# Patient Record
Sex: MALE | Race: BLACK OR AFRICAN AMERICAN | NOT HISPANIC OR LATINO | Employment: OTHER | URBAN - METROPOLITAN AREA
[De-identification: names, ages, dates, MRNs, and addresses within clinical notes are randomized per-mention and may not be internally consistent; named-entity substitution may affect disease eponyms.]

---

## 2020-07-15 PROBLEM — Z72.0 TOBACCO ABUSE: Status: ACTIVE | Noted: 2020-07-15

## 2020-07-15 PROBLEM — C34.92 PRIMARY LUNG ADENOCARCINOMA, LEFT: Status: ACTIVE | Noted: 2020-07-15

## 2020-07-15 PROBLEM — E11.9 TYPE 2 DIABETES MELLITUS WITHOUT COMPLICATION, WITHOUT LONG-TERM CURRENT USE OF INSULIN: Status: ACTIVE | Noted: 2020-07-15

## 2020-07-16 ENCOUNTER — OFFICE VISIT (OUTPATIENT)
Dept: HEMATOLOGY/ONCOLOGY | Facility: CLINIC | Age: 70
End: 2020-07-16
Payer: COMMERCIAL

## 2020-07-16 ENCOUNTER — LAB VISIT (OUTPATIENT)
Dept: LAB | Facility: HOSPITAL | Age: 70
End: 2020-07-16
Attending: INTERNAL MEDICINE
Payer: COMMERCIAL

## 2020-07-16 VITALS
HEIGHT: 71 IN | TEMPERATURE: 98 F | BODY MASS INDEX: 16.02 KG/M2 | OXYGEN SATURATION: 95 % | SYSTOLIC BLOOD PRESSURE: 198 MMHG | HEART RATE: 91 BPM | WEIGHT: 114.44 LBS | DIASTOLIC BLOOD PRESSURE: 123 MMHG | RESPIRATION RATE: 18 BRPM

## 2020-07-16 DIAGNOSIS — Z72.0 TOBACCO ABUSE: ICD-10-CM

## 2020-07-16 DIAGNOSIS — E11.9 TYPE 2 DIABETES MELLITUS WITHOUT COMPLICATION, WITHOUT LONG-TERM CURRENT USE OF INSULIN: ICD-10-CM

## 2020-07-16 DIAGNOSIS — C34.92 PRIMARY LUNG ADENOCARCINOMA, LEFT: ICD-10-CM

## 2020-07-16 DIAGNOSIS — J44.9 CHRONIC OBSTRUCTIVE PULMONARY DISEASE, UNSPECIFIED COPD TYPE: ICD-10-CM

## 2020-07-16 DIAGNOSIS — C34.92 PRIMARY LUNG ADENOCARCINOMA, LEFT: Primary | ICD-10-CM

## 2020-07-16 LAB
ALBUMIN SERPL BCP-MCNC: 3.5 G/DL (ref 3.5–5.2)
ALP SERPL-CCNC: 95 U/L (ref 55–135)
ALT SERPL W/O P-5'-P-CCNC: 16 U/L (ref 10–44)
ANION GAP SERPL CALC-SCNC: 11 MMOL/L (ref 8–16)
AST SERPL-CCNC: 35 U/L (ref 10–40)
BASOPHILS # BLD AUTO: 0.02 K/UL (ref 0–0.2)
BASOPHILS NFR BLD: 0.4 % (ref 0–1.9)
BILIRUB SERPL-MCNC: 0.3 MG/DL (ref 0.1–1)
BUN SERPL-MCNC: 18 MG/DL (ref 8–23)
CALCIUM SERPL-MCNC: 9.5 MG/DL (ref 8.7–10.5)
CHLORIDE SERPL-SCNC: 108 MMOL/L (ref 95–110)
CO2 SERPL-SCNC: 24 MMOL/L (ref 23–29)
CREAT SERPL-MCNC: 1.6 MG/DL (ref 0.5–1.4)
DIFFERENTIAL METHOD: ABNORMAL
EOSINOPHIL # BLD AUTO: 0.2 K/UL (ref 0–0.5)
EOSINOPHIL NFR BLD: 4.4 % (ref 0–8)
ERYTHROCYTE [DISTWIDTH] IN BLOOD BY AUTOMATED COUNT: 17.5 % (ref 11.5–14.5)
EST. GFR  (AFRICAN AMERICAN): 49 ML/MIN/1.73 M^2
EST. GFR  (NON AFRICAN AMERICAN): 43 ML/MIN/1.73 M^2
GLUCOSE SERPL-MCNC: 106 MG/DL (ref 70–110)
HCT VFR BLD AUTO: 42.3 % (ref 40–54)
HGB BLD-MCNC: 13.1 G/DL (ref 14–18)
IMM GRANULOCYTES # BLD AUTO: 0.01 K/UL (ref 0–0.04)
IMM GRANULOCYTES NFR BLD AUTO: 0.2 % (ref 0–0.5)
LYMPHOCYTES # BLD AUTO: 1.2 K/UL (ref 1–4.8)
LYMPHOCYTES NFR BLD: 25.8 % (ref 18–48)
MCH RBC QN AUTO: 27.9 PG (ref 27–31)
MCHC RBC AUTO-ENTMCNC: 31 G/DL (ref 32–36)
MCV RBC AUTO: 90 FL (ref 82–98)
MONOCYTES # BLD AUTO: 0.4 K/UL (ref 0.3–1)
MONOCYTES NFR BLD: 8.1 % (ref 4–15)
NEUTROPHILS # BLD AUTO: 2.9 K/UL (ref 1.8–7.7)
NEUTROPHILS NFR BLD: 61.1 % (ref 38–73)
NRBC BLD-RTO: 0 /100 WBC
PLATELET # BLD AUTO: 201 K/UL (ref 150–350)
PMV BLD AUTO: 8.9 FL (ref 9.2–12.9)
POTASSIUM SERPL-SCNC: 4.4 MMOL/L (ref 3.5–5.1)
PROT SERPL-MCNC: 8.1 G/DL (ref 6–8.4)
RBC # BLD AUTO: 4.7 M/UL (ref 4.6–6.2)
SODIUM SERPL-SCNC: 143 MMOL/L (ref 136–145)
WBC # BLD AUTO: 4.72 K/UL (ref 3.9–12.7)

## 2020-07-16 PROCEDURE — 36415 COLL VENOUS BLD VENIPUNCTURE: CPT

## 2020-07-16 PROCEDURE — 99999 PR PBB SHADOW E&M-NEW PATIENT-LVL V: ICD-10-PCS | Mod: PBBFAC,,, | Performed by: INTERNAL MEDICINE

## 2020-07-16 PROCEDURE — 99205 PR OFFICE/OUTPT VISIT, NEW, LEVL V, 60-74 MIN: ICD-10-PCS | Mod: S$PBB,,, | Performed by: INTERNAL MEDICINE

## 2020-07-16 PROCEDURE — 99205 OFFICE O/P NEW HI 60 MIN: CPT | Mod: S$PBB,,, | Performed by: INTERNAL MEDICINE

## 2020-07-16 PROCEDURE — 80053 COMPREHEN METABOLIC PANEL: CPT

## 2020-07-16 PROCEDURE — 85025 COMPLETE CBC W/AUTO DIFF WBC: CPT

## 2020-07-16 PROCEDURE — 99205 OFFICE O/P NEW HI 60 MIN: CPT | Mod: PBBFAC | Performed by: INTERNAL MEDICINE

## 2020-07-16 PROCEDURE — 99999 PR PBB SHADOW E&M-NEW PATIENT-LVL V: CPT | Mod: PBBFAC,,, | Performed by: INTERNAL MEDICINE

## 2020-07-16 RX ORDER — METFORMIN HYDROCHLORIDE 850 MG/1
850 TABLET ORAL
COMMUNITY

## 2020-07-16 RX ORDER — ASPIRIN 81 MG/1
81 TABLET ORAL
COMMUNITY

## 2020-07-16 RX ORDER — BUDESONIDE 0.5 MG/2ML
2 INHALANT ORAL
COMMUNITY

## 2020-07-16 RX ORDER — BISACODYL 5 MG
TABLET, DELAYED RELEASE (ENTERIC COATED) ORAL
COMMUNITY

## 2020-07-16 RX ORDER — ENALAPRIL MALEATE 20 MG/1
40 TABLET ORAL
COMMUNITY

## 2020-07-16 RX ORDER — ALENDRONATE SODIUM 10 MG/1
TABLET ORAL
COMMUNITY

## 2020-07-16 RX ORDER — ATORVASTATIN CALCIUM 80 MG/1
80 TABLET, FILM COATED ORAL
COMMUNITY

## 2020-07-16 RX ORDER — HYDROCHLOROTHIAZIDE 12.5 MG/1
12.5 CAPSULE ORAL
COMMUNITY

## 2020-07-16 NOTE — PATIENT INSTRUCTIONS
Labs today  MRI brain and RV after that   COMFORT for:  - imaging CD uploaded of PET-CT  - pathology  Report from bronchoscopy/EBUS from [facility]  - colonoscopy report and pathology report from [facility] in [place]

## 2020-07-16 NOTE — PROGRESS NOTES
"  Subjective:      DATE OF VISIT: 7/16/20     ?  Patient ID:?Mikey Saldaña is a 69 y.o. male.?? MR#: [id number]   ?   PRIMARY ONCOLOGIST: Dr. Marie    REFERRING PROVIDER: [facility]  [address] [place] [address]     ? Primary Care Providers:  [facility] - * (General)     CHIEF COMPLAINT:  Newly diagnosed lung cancer[facility]   ?   ONCOLOGIC DIAGNOSIS:  Lung adenocarcinoma  ?   CURRENT TREATMENT: TBD    PAST TREATMENT: none  ?   ONCOLOGIC HISTORY:   ?   Mr. Saldaña is a 69-year-old man [employer] , current tobacco user of 40 pack years, type 2 diabetes, hypertension, hepatitis-B, elevated PSA, carotid artery stenosis, peripheral vascular disease, osteoporosis, depression, PTSD.    [facility] due to growth of suspicious nodule on CT, followed up by PET-CT.    03/13/2020 PET-CT showed "moderate FDG uptake at right lower lobe noncalcified pulmonary nodule measuring 1.2 x 0.9 cm, which is suspicious for malignancy with a differential also including sequelae from an infectious/inflammatory etiology.  No definite moderate or intensely avid FDG avid suspicious lymph node.  2. focal intense FDG uptake at right transverse colon which localizes with bowel wall soft tissue density measuring 2.0 x 2.8 cm which is suspicious for colonic neoplasia which could be a malignant or benign.  3.  Aftormentioned focal intense FDG uptake at right transverse colon immediately abuts the margin of the right liver lobe anteriorly for which involvement of the liver cannot be completely excluded."    In June 2020 at [facility] he underwent EB[facility] with biopsy. [facility]: "sampling right lower lobe with lepidic adenocarcinoma.  EBUS TBNA of 11 L with rare atypical cells.  4L, 4R and 11R negative for malignancy.  11R suspicious for malignancy.  10 mm."    06/19/2020 [facility] noted: "reviewed directly via [facility] records " "consistent with pT1b N1 with suspicious for malignancy from 11 RS"    Patient colonoscopy which reportedly showed polyps, do not procedure or pathology reports available at this time.    INTERVAL EVENTS    During his visit me today he is accompanied by his daughter lives in [place] but is staying for the time being.  He denies any symptoms aside from stable dyspnea on exertion.  Anorexia or weight loss, cough, hemoptysis hematemesis, chest or other pain.  He continues to smoke 1 pack per day on average for about 40 years.        Review of Systems    ?   A comprehensive 14-point review of systems was reviewed with patient and was negative other than as specified above.   ?   PAST MEDICAL HISTORY:   History reviewed. No pertinent past medical history. ?     PAST SURGICAL HISTORY:   Past Surgical History:   Procedure Laterality Date    NECK SURGERY      PANCREAS SURGERY      SHOULDER SURGERY        ?   ALLERGIES:   Allergies as of 07/16/2020 - Reviewed 07/16/2020   Allergen Reaction Noted    Shrimp Hives, Itching, and Rash 08/16/2018      ?   MEDICATIONS:?   No outpatient medications have been marked as taking for the 7/16/20 encounter (Office Visit) with Alyssa Marie MD.      ?   SOCIAL HISTORY:?   Social History     Tobacco Use    Smoking status: Current Every Day Smoker    Smokeless tobacco: Never Used   Substance Use Topics    Alcohol use: Never     Frequency: Never      ?   1 PACK PER DAY TIMES 40 YEARS, CURRENT SMOKER   ?   FAMILY HISTORY:   family history is not on file.   ?       Objective:      Physical Exam      ?   Vitals:    07/16/20 0818   BP: (!) 198/123   Pulse: 91   Resp: 18   Temp: 97.6 °F (36.4 °C)      ?   ECOG:?1   General appearance: Generally well appearing, in no acute distress, for all.   Head, eyes, ears, nose, and throat: Pupils round and equally reactive to light. Oropharynx clear with moist mucous membranes.   Lymph: No palpable cervical or supraclavicular lymphadenopathy. " "  Cardiovascular: Regular rate and rhythm, S1, S2, no audible murmurs.   Respiratory: Lungs clear to auscultation bilaterally.   Abdomen:  Scaphoid, nondistended, nontender, soft  Extremities: Warm, without edema.   Neurologic: Alert and oriented. Grossly normal strength, coordination, and gait.   Skin: No rashes, ecchymoses or petechial lesion.   ?     Laboratory:  ?   No results found for any previous visit.      ?     ?   ?   Imaging:  ?  I have reviewed the patient's medical history in detail and updated the computerized patient record.        Pathology:    None available     ?   Assessment/Plan:       1. Primary lung adenocarcinoma, left    2. Tobacco abuse    3. Type 2 diabetes mellitus without complication, without long-term current use of insulin    4. Chronic obstructive pulmonary disease, unspecified COPD type      thank     Plan:     # primary lung adenocarcinoma: [facility] provided: 1.2 cm FDG avid lesion in right lower lobe without reported avid lymph node.  Right lower lobe lesion  biopsy positive for lepidic adenocarcinoma" EBUS noted 11 L with rare atypical cells, for L, 4R and 11R negative for malignancy, 11 are suspicious 10 mm.  I discussed findings per my review of outside records and need to obtain additional information to confirm stage.  Will need MRI brain for complete staging and likely radiation oncology consultation as well.  - request CD to personally review PET-CT, EBUS report, pathology from EBUS    # colonic lesion/possible liver abnormality:  Per outside records  "focal intense FDG uptake at right transverse colon which localizes with bowel wall soft tissue density measuring 2.0 x 2.8 cm which is suspicious for colonic neoplasia which could be a malignant or benign.  3.  Aftormentioned focal intense FDG uptake at right transverse colon immediately abuts the margin of the right liver lobe anteriorly for which involvement of the liver cannot be completely excluded."  Discussed " importance of excluding 2nd neoplasm or metastatic disease in colon and or liver.  May need additional liver imaging.  Requesting PET-CT for further review.  Patient reports colonoscopy with benign polyps.  Will request this pathology report.    # current tobacco user:  40 pack-year smoker, COPD, tenuous functional status and will need to take into account with treatment planning.      Follow-Up:   Labs today  MRI brain and RV after that   COMFORT for:  - imaging CD uploaded of PET-CT  - pathology  Report from bronchoscopy/EBUS from [facility]  - colonoscopy report and pathology report from [facility]

## 2020-07-16 NOTE — LETTER
July 16, 2020      [facility]  [address] [place] [address] [address]            [facility] - Hematology Oncology  [id number] [place] [id number]  Phone: [phone]  Fax: [phone]          Patient: Mikey Saldaña   MR Number: [id number]   YOB: 1950   Date of Visit: 7/16/2020       Dear [facility]:    Thank you for referring Mikey Saldaña to me for evaluation. Attached you will find relevant portions of my assessment and plan of care.    If you have questions, please do not hesitate to call me. I look forward to following Mikey Saldaña along with you.    Sincerely,    Alyssa Marie MD    Enclosure  CC:  No Recipients    If you would like to receive this communication electronically, please contact [email or web address] or [phone] to request more information on [facility] access.    For providers and/or their staff who would like to refer a patient to [facility], please contact us through our one-stop-shop provider referral line, Rajeev Roberto, at [phone].    If you feel you have received this communication in error or would no longer like to receive these types of communications, please e-mail [email or web address]

## 2020-07-22 ENCOUNTER — DOCUMENTATION ONLY (OUTPATIENT)
Dept: HEMATOLOGY/ONCOLOGY | Facility: CLINIC | Age: 70
End: 2020-07-22

## 2020-07-22 ENCOUNTER — TELEPHONE (OUTPATIENT)
Dept: HEMATOLOGY/ONCOLOGY | Facility: CLINIC | Age: 70
End: 2020-07-22

## 2020-07-22 NOTE — PROGRESS NOTES
7/22/2020 @ 220pm-Received pathology report via email from Sandra Ventura,   Scanned into media tab of epic chart.

## 2020-07-23 ENCOUNTER — HOSPITAL ENCOUNTER (OUTPATIENT)
Dept: RADIOLOGY | Facility: HOSPITAL | Age: 70
Discharge: HOME | End: 2020-07-23
Attending: INTERNAL MEDICINE
Payer: OTHER GOVERNMENT

## 2020-07-23 ENCOUNTER — INITIAL CONSULT (OUTPATIENT)
Dept: RADIATION ONCOLOGY | Facility: CLINIC | Age: 70
End: 2020-07-23
Payer: COMMERCIAL

## 2020-07-23 ENCOUNTER — OFFICE VISIT (OUTPATIENT)
Dept: HEMATOLOGY/ONCOLOGY | Facility: CLINIC | Age: 70
End: 2020-07-23
Payer: COMMERCIAL

## 2020-07-23 VITALS
TEMPERATURE: 98 F | SYSTOLIC BLOOD PRESSURE: 172 MMHG | BODY MASS INDEX: 16.32 KG/M2 | OXYGEN SATURATION: 94 % | DIASTOLIC BLOOD PRESSURE: 90 MMHG | HEIGHT: 70 IN | WEIGHT: 114 LBS | RESPIRATION RATE: 17 BRPM | HEART RATE: 89 BPM

## 2020-07-23 VITALS
SYSTOLIC BLOOD PRESSURE: 160 MMHG | TEMPERATURE: 98 F | WEIGHT: 114 LBS | BODY MASS INDEX: 16.32 KG/M2 | HEIGHT: 70 IN | DIASTOLIC BLOOD PRESSURE: 93 MMHG | HEART RATE: 94 BPM | RESPIRATION RATE: 14 BRPM

## 2020-07-23 DIAGNOSIS — K63.9 SMALL BOWEL LESION: ICD-10-CM

## 2020-07-23 DIAGNOSIS — C34.92 PRIMARY LUNG ADENOCARCINOMA, LEFT: ICD-10-CM

## 2020-07-23 DIAGNOSIS — C34.91 PRIMARY LUNG ADENOCARCINOMA, RIGHT: Primary | ICD-10-CM

## 2020-07-23 DIAGNOSIS — C34.92 PRIMARY LUNG ADENOCARCINOMA, LEFT: Primary | ICD-10-CM

## 2020-07-23 PROCEDURE — 99214 OFFICE O/P EST MOD 30 MIN: CPT | Mod: PBBFAC,25 | Performed by: RADIOLOGY

## 2020-07-23 PROCEDURE — 99999 PR PBB SHADOW E&M-EST. PATIENT-LVL IV: ICD-10-PCS | Mod: PBBFAC,,, | Performed by: RADIOLOGY

## 2020-07-23 PROCEDURE — 99215 OFFICE O/P EST HI 40 MIN: CPT | Mod: S$PBB,,, | Performed by: INTERNAL MEDICINE

## 2020-07-23 PROCEDURE — A9585 GADOBUTROL INJECTION: HCPCS | Performed by: INTERNAL MEDICINE

## 2020-07-23 PROCEDURE — 99205 PR OFFICE/OUTPT VISIT, NEW, LEVL V, 60-74 MIN: ICD-10-PCS | Mod: S$PBB,,, | Performed by: RADIOLOGY

## 2020-07-23 PROCEDURE — 99999 PR PBB SHADOW E&M-EST. PATIENT-LVL V: ICD-10-PCS | Mod: PBBFAC,,, | Performed by: INTERNAL MEDICINE

## 2020-07-23 PROCEDURE — 70553 MRI BRAIN STEM W/O & W/DYE: CPT | Mod: TC

## 2020-07-23 PROCEDURE — 99215 PR OFFICE/OUTPT VISIT, EST, LEVL V, 40-54 MIN: ICD-10-PCS | Mod: S$PBB,,, | Performed by: INTERNAL MEDICINE

## 2020-07-23 PROCEDURE — 99215 OFFICE O/P EST HI 40 MIN: CPT | Mod: PBBFAC,25,27 | Performed by: INTERNAL MEDICINE

## 2020-07-23 PROCEDURE — 99999 PR PBB SHADOW E&M-EST. PATIENT-LVL IV: CPT | Mod: PBBFAC,,, | Performed by: RADIOLOGY

## 2020-07-23 PROCEDURE — 99205 OFFICE O/P NEW HI 60 MIN: CPT | Mod: S$PBB,,, | Performed by: RADIOLOGY

## 2020-07-23 PROCEDURE — 99999 PR PBB SHADOW E&M-EST. PATIENT-LVL V: CPT | Mod: PBBFAC,,, | Performed by: INTERNAL MEDICINE

## 2020-07-23 PROCEDURE — 25500020 PHARM REV CODE 255: Performed by: INTERNAL MEDICINE

## 2020-07-23 RX ORDER — GADOBUTROL 604.72 MG/ML
10 INJECTION INTRAVENOUS
Status: COMPLETED | OUTPATIENT
Start: 2020-07-23 | End: 2020-07-23

## 2020-07-23 RX ADMIN — GADOBUTROL 5 ML: 604.72 INJECTION INTRAVENOUS at 06:07

## 2020-07-23 NOTE — Clinical Note
Hey, I forgot to mention a few things to you 1. I am requesting actual path report from EBUS (cannot find here) to determine adequacy of sample. 2. I am concerned about small bowel lesion and transverse colon/liver? Avidity. We will need to fup on repeat PET and possible biopsy since not evaluated on scope. Im getting a cea too.

## 2020-07-23 NOTE — PROGRESS NOTES
"  Subjective:      DATE OF VISIT: 7/23/20     ?  Patient ID:?Mikey Saldaña is a 69 y.o. male.?? MR#: [id number]   ?   PRIMARY ONCOLOGIST: Dr. Marie    ? Primary Care Providers:  [facility] - * (General)     CHIEF COMPLAINT:  Follow-up  ?   ONCOLOGIC DIAGNOSIS:  Lung adenocarcinoma  ?   CURRENT TREATMENT: TBD    PAST TREATMENT: none  ?   ONCOLOGIC HISTORY:   ?   Mr. Saldaña is a 69-year-old man [employer] , current tobacco user of 40 pack years, type 2 diabetes, hypertension, hepatitis-B, elevated PSA, carotid artery stenosis, peripheral vascular disease, osteoporosis, depression, PTSD.    [facility] due to growth of suspicious nodule on CT, followed up by PET-CT.    03/13/2020 PET-CT showed "moderate FDG uptake at right lower lobe noncalcified pulmonary nodule measuring 1.2 x 0.9 cm, which is suspicious for malignancy with a differential also including sequelae from an infectious/inflammatory etiology.  No definite moderate or intensely avid FDG avid suspicious lymph node.  2. focal intense FDG uptake at right transverse colon which localizes with bowel wall soft tissue density measuring 2.0 x 2.8 cm which is suspicious for colonic neoplasia which could be a malignant or benign.  3.  Aftormentioned focal intense FDG uptake at right transverse colon immediately abuts the margin of the right liver lobe anteriorly for which involvement of the liver cannot be completely excluded."  In body of radiology report notes  RLL pulmonary nodule SUV 3.7, R hilar  2.6, L hilar 2.3, precarinal lymph node 1.3x1.5.     RLL lesion outside pathology report notes:  " lepidic non mucinous adenocarcinoma consistent with lung primary. Positive for napsin A, TTF1 CK5/6 and CK7, negative for CK 20,  p40 and p63."    June 2020 EBUS at [facility]. Per a clinic note (do not have pathology report) EBUS TBNA of 11 L with rare atypical cells.  4L, 4R and 11R negative for malignancy.  "     5/26/20 colonoscopy at [facility] report noted 2 sessile transverse colon polyps.  Pathology from outside noting tubular adenoma.    [facility] for evidence of metastatic disease    INTERVAL EVENTS    He is accompanied by his daughter to review results of MRI brain and follow-up after I have received and reviewed outside records including:  Uploaded imaging from outside including PET March 2020, pathology report from right lower lobe lesion, colonoscopy report and pathology report, EBUS report (no pathology report provided from my review).  He is in stable condition and denies any productive cough/hemoptysis, chest pain, shortness of breath or change from baseline dyspnea on exertion.  He continues to smoke.      Review of Systems    ?   A comprehensive 14-point review of systems was reviewed with patient and was negative other than as specified above.   ?   Objective:      Physical Exam      ?   Vitals:    07/23/20 0900   BP: (!) 160/93   Pulse: 94   Resp: 14   Temp: 97.7 °F (36.5 °C)      ?   ECOG:?1   General appearance: Generally well appearing, in no acute distress, for all.   Head, eyes, ears, nose, and throat: Pupils round and equally reactive to light. Oropharynx clear with moist mucous membranes.   Lymph: No palpable cervical or supraclavicular lymphadenopathy.   Cardiovascular: Regular rate and rhythm, S1, S2, no audible murmurs.   Respiratory: Lungs clear to auscultation bilaterally.   Abdomen:  Scaphoid, nondistended, nontender, soft  Extremities: Warm, without edema.   Neurologic: Alert and oriented. Grossly normal strength, coordination, and gait.   Skin: No rashes, ecchymoses or petechial lesion.   ?     Laboratory:  ?   No visits with results within 1 Day(s) from this visit.   Latest known visit with results is:   Lab Visit on 07/16/2020   Component Date Value Ref Range Status    WBC 07/16/2020 4.72  3.90 - 12.70 K/uL Final    RBC 07/16/2020 4.70  4.60 - 6.20 M/uL Final     Hemoglobin 07/16/2020 13.1* 14.0 - 18.0 g/dL Final    Hematocrit 07/16/2020 42.3  40.0 - 54.0 % Final    Mean Corpuscular Volume 07/16/2020 90  82 - 98 fL Final    Mean Corpuscular Hemoglobin 07/16/2020 27.9  27.0 - 31.0 pg Final    Mean Corpuscular Hemoglobin Conc 07/16/2020 31.0* 32.0 - 36.0 g/dL Final    RDW 07/16/2020 17.5* 11.5 - 14.5 % Final    Platelets 07/16/2020 201  150 - 350 K/uL Final    MPV 07/16/2020 8.9* 9.2 - 12.9 fL Final    Immature Granulocytes 07/16/2020 0.2  0.0 - 0.5 % Final    Gran # (ANC) 07/16/2020 2.9  1.8 - 7.7 K/uL Final    Immature Grans (Abs) 07/16/2020 0.01  0.00 - 0.04 K/uL Final    Lymph # 07/16/2020 1.2  1.0 - 4.8 K/uL Final    Mono # 07/16/2020 0.4  0.3 - 1.0 K/uL Final    Eos # 07/16/2020 0.2  0.0 - 0.5 K/uL Final    Baso # 07/16/2020 0.02  0.00 - 0.20 K/uL Final    nRBC 07/16/2020 0  0 /100 WBC Final    Gran% 07/16/2020 61.1  38.0 - 73.0 % Final    Lymph% 07/16/2020 25.8  18.0 - 48.0 % Final    Mono% 07/16/2020 8.1  4.0 - 15.0 % Final    Eosinophil% 07/16/2020 4.4  0.0 - 8.0 % Final    Basophil% 07/16/2020 0.4  0.0 - 1.9 % Final    Differential Method 07/16/2020 Automated   Final    Sodium 07/16/2020 143  136 - 145 mmol/L Final    Potassium 07/16/2020 4.4  3.5 - 5.1 mmol/L Final    Chloride 07/16/2020 108  95 - 110 mmol/L Final    CO2 07/16/2020 24  23 - 29 mmol/L Final    Glucose 07/16/2020 106  70 - 110 mg/dL Final    BUN, Bld 07/16/2020 18  8 - 23 mg/dL Final    Creatinine 07/16/2020 1.6* 0.5 - 1.4 mg/dL Final    Calcium 07/16/2020 9.5  8.7 - 10.5 mg/dL Final    Total Protein 07/16/2020 8.1  6.0 - 8.4 g/dL Final    Albumin 07/16/2020 3.5  3.5 - 5.2 g/dL Final    Total Bilirubin 07/16/2020 0.3  0.1 - 1.0 mg/dL Final    Alkaline Phosphatase 07/16/2020 95  55 - 135 U/L Final    AST 07/16/2020 35  10 - 40 U/L Final    ALT 07/16/2020 16  10 - 44 U/L Final    Anion Gap 07/16/2020 11  8 - 16 mmol/L Final    eGFR if African American 07/16/2020  "49* >60 mL/min/1.73 m^2 Final    eGFR if non African American 07/16/2020 43* >60 mL/min/1.73 m^2 Final      ?   ?   Imaging:  ?  I have reviewed the patient's medical history in detail and updated the computerized patient record.      MRI BRAIN W WO CONTRAST     CLINICAL HISTORY:  Malignant neoplasm of unspecified part of left bronchus or lungstaging;     TECHNIQUE:  Sagittal T1. Axial T1, T2, T2 FLAIR, GRE, DWI.  Axial and coronal T1 post contrast.     COMPARISON:  None available.     FINDINGS:  There is no restricted diffusion. Minimal patchy T2 FLAIR hyperintensity within the periventricular and deep white matter bilaterally.  No abnormal enhancement.     The ventricles and sulci are normal in size and configuration. Midline structures are normal. There are preserved arterial flow-voids on T2 weighted imaging. Bilateral mastoid tip effusions.  The paranasal sinuses are clear.     No abnormal marrow signal is identified.     Impression:     1. No evidence of metastatic disease.  2. White matter T2 alteration consistent with mild chronic microvascular ischemia.     Pathology:    I have reviewed the patient's medical history in detail and updated the computerized patient record.    ?   Assessment/Plan:       1. Primary lung adenocarcinoma, right    2. Primary lung adenocarcinoma, left    3. Small bowel lesion        Plan:     # right lower lobe adenocarcinoma, stage TBD:  I reviewed in detail available outside records which is notable for 1.2 cm right lower lobe lesion biopsy proven adenocarcinoma most consistent with lung primary per outside pathology report noting "lipidic non mucinous Positive for napsin A, TTF1 CK5/6 and CK7, negative for CK 20,  p40 and p63."  Unclear mediastinal lymph node involvement.  PET shows mild avidity in bilateral hilar (SUV 2.6 and 2.3 right and left respectively) and enlarged precarinal lymph node 1.3 x 1.5 cm EBUS and precarinal lymph nodes SUV, [facility] provided: 1.2 cm FDG " "avid lesion in right lower lobe without reported avid lymph node.  Right lower lobe lesion  biopsy positive for lepidic adenocarcinoma" EBUS procedure report without notable in large mint noted less than or equal to 1 cm; pathology per a clinic note showed 11L rare atypical cells, 4R, 4L and 11R without malignancy however it should be noted that I do not have actual pathology report and unclear if there was adequate sampling of these lymph nodes.   I have discussed his case with Dr. Conley in radiation oncology and we have decided to get repeat PET-CT to further evaluate extent of disease especially given interval of 4 months since his initial PET imaging on outside. I will also request pathology report from EBUS.    # colonic lesion/possible liver abnormality:  Per outside records  "focal intense FDG uptake at right transverse colon which localizes with bowel wall soft tissue density measuring 2.0 x 2.8 cm which is suspicious for colonic neoplasia which could be a malignant or benign.  3.  Aftormentioned focal intense FDG uptake at right transverse colon immediately abuts the margin of the right liver lobe anteriorly for which involvement of the liver cannot be completely excluded."  Discussed importance of excluding 2nd neoplasm or metastatic disease in colon and or liver.   - repeat PET-CT per above. May require dedicated liver imaging.  - coloscopy report from [facility] noted sessile polyps tubular adenoma.   It does not appear small bowel abnormality was biopsied in may need to consider if repeat PET continues to show avidity.  CEA ordered    # current tobacco user:  40 pack-year smoker, COPD, tenuous functional status and will need to take into account with treatment planning.      Follow-Up:   Repeat PET-CT ordered, RV after this  Lab CEA   Request for EBUS pathology report        "

## 2020-07-23 NOTE — PROGRESS NOTES
[facility] [place]  RADIATION ONCOLOGY CONSULTATION    Name: Mikey Saldaña  : 1950      Patient Referred To Radiation Oncology By:  Dr. 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 male who presents for consultation for the above diagnosis.   [facility] noted growth of lung nodule.  PET 3/13/20 showed moderate FDG uptake at RLL nodule 1.2 x 0.9cm, suspicious for malignancy, no definitive avid adenopathy, FDG focal intense uptake in R transverse colon suspicious for neoplasm.  2020 he had [facility] and [facility] notes sampling RLL with lepidic adenocarcinoma, EBUS TBNA 11L rare atypical cells, 4L, 4R, 11R negative for malignancy.   Pathology 20 showed consistent with pT1bN1 suspicious for malignancy in 11R.   Colonoscopy showed polyps, no path reports available.   He saw Dr. Marie and workup has already begun to obtain records and determine stage.  MRI brain today was negative for intracranial disease.  Today, he notes stable shortness of breath.  He has lost about 15lbs in the past few months. He is still smoking 1-2 packs per day and is not interested in quitting, but can cut down a bit.  Denies hemoptysis, worsening shortness of breath, chest pain, dysphagia/odynophagia.    REVIEW OF SYSTEMS: (Positive findings bold, otherwise negative)   Constitutional: fever, fatigue, weight change  Eyes: blurred vision in the past 3 months, double vision   ENT: ear pain, new mouth lesions, jaw pain, difficulty swallowing, sore throat  Cardiovascular: chest pain on exertion, reflux, leg swelling  Respiratory: shortness of breath, dyspnea, cough, hemoptysis.   GI: abdominal pain, diarrhea, constipation, blood in stool, painful bowel movements  : painful or burning  urination, blood in urine  Musculoskeletal: new bone or joint pains  Neurologic: headache, seizure, focal numbness or tingling, balance changes, speech changes  Lymph: new or enlarged lymph nodes  Psychiatric: depression, anxiety    PRIOR RADIATION HISTORY: none    PAST MEDICAL HISTORY:  No past medical history on file.    PAST SURGICAL HISTORY:  Past Surgical History:   Procedure Laterality Date    NECK SURGERY      PANCREAS SURGERY      SHOULDER SURGERY         ALLERGIES:   Review of patient's allergies indicates:   Allergen Reactions    Shrimp Hives, Itching and Rash       MEDICATIONS:    Current Outpatient Medications:     alendronate (FOSAMAX) 10 MG Tab, alendronate, Disp: , Rfl:     aspirin (ECOTRIN) 81 MG EC tablet, Take 81 mg by mouth., Disp: , Rfl:     atorvastatin (LIPITOR) 80 MG tablet, Take 80 mg by mouth., Disp: , Rfl:     bisacodyL (DULCOLAX, BISACODYL,) 5 mg EC tablet, Dulcolax (bisacodyl) 5 mg tablet,delayed release  Take 4 tablets by oral route as directed., Disp: , Rfl:     budesonide (PULMICORT) 0.5 mg/2 mL nebulizer solution, 2 mLs., Disp: , Rfl:     enalapril (VASOTEC) 20 MG tablet, Take 40 mg by mouth., Disp: , Rfl:     hydroCHLOROthiazide (MICROZIDE) 12.5 mg capsule, Take 12.5 mg by mouth., Disp: , Rfl:     metFORMIN (GLUCOPHAGE) 850 MG tablet, Take 850 mg by mouth., Disp: , Rfl:     sildenafil citrate (VIAGRA ORAL), sildenafil, Disp: , Rfl:     varenicline tartrate (CHANTIX ORAL), varenicline, Disp: , Rfl:   No current facility-administered medications for this visit.     SOCIAL HISTORY:  Social History     Socioeconomic History    Marital status:      Spouse name: Not on file    Number of children: Not on file    Years of education: Not on file    Highest education level: Not on file   Occupational History    Not on file   Social Needs    Financial resource strain: Not on file    Food insecurity     Worry: Not on file     Inability: Not on file    Transportation  "needs     Medical: Not on file     Non-medical: Not on file   Tobacco Use    Smoking status: Current Every Day Smoker    Smokeless tobacco: Never Used   Substance and Sexual Activity    Alcohol use: Never     Frequency: Never    Drug use: Not on file    Sexual activity: Not on file   Lifestyle    Physical activity     Days per week: Not on file     Minutes per session: Not on file    Stress: Not on file   Relationships    Social connections     Talks on phone: Not on file     Gets together: Not on file     Attends Mosque service: Not on file     Active member of club or organization: Not on file     Attends meetings of clubs or organizations: Not on file     Relationship status: Not on file   Other Topics Concern    Not on file   Social History Narrative    Not on file     Lives in [place]    FAMILY HISTORY:  No family history on file.    PHYSICAL EXAMINATION:  Constitutional: thin, well appearing, no acute distress, ECOG 1 - Ambulates, capable of light work  Vitals:    BP (!) 172/90   Pulse 89   Temp 97.7 °F (36.5 °C)   Resp 17   Ht 5' 10" (1.778 m)   Wt 51.7 kg (113 lb 15.7 oz)   SpO2 (!) 94%   BMI 16.35 kg/m²   Eyes: sclera anicteric, EOMI, pupils equal, round and reactive to light  ENT: oral cavity without lesions, moist mucous membranes  Neck: trachea midline, neck supple  Lymphatic: no cervical, supraclavicular or axillary adenopathy  Cardiovascular: regular rate, no murmurs, no edema of the upper or lower extremities, radial pulse 2+  Respiratory: unlabored effort, clear to auscultation, no wheezes  Abdomen: soft, non-tender, no rigidity, no masses, no hepatomegaly  Neuro: Cranial nerves III-XII intact, speech not slurred, gait non-ataxic, no dysdiadochokinesia, strength 5/5 upper and lower extremities, reflexes patellar  Spine: non-tender to percussion cervical, thoracic and lumbosacral spine    IMAGING AND LABORATORY FINDINGS: As per HPI; images reviewed personally.    ASSESSMENT: 69 " y.o. male with likely stage II NSCLC pending complete workup and new PET    PLAN: Mr. Saldaña has what appears to be a NSCLC with hilar node involvement based on prior workup. We will need to obtain records to decide if he would be served best by chemoradiation for stage IIB NSCLC vs SBRT alone for stage I NSCLC. He would likely be best served by repeat PET as last PET was in March; Dr. Marei has ordered.    Radiation options are 60Gy/30fx with chemotherapy vs SBRT alone. Some potential toxicities include fatigue, esophagitis, and risk of pneumonitis. I lean towards 60Gy/30fx given his likelihood of lymph node involvement is high after reviewing records, but will decide based on new PET next week.    We discussed the techniques, toxicities and indications of radiation and I answered the patient's questions to their apparent satisfaction.CT simulation will be scheduled after PET next week to expedite treatment.    I spent approximately 60 minutes reviewing the available records and evaluating the patient, out of which over 50% of the time was spent face to face with the patient in counseling and coordinating this patient's care.    Paul Conley III, M.D.  Radiation Oncology  [facility] [address] [place] [address]  Ph: [phone]  [email or web address]

## 2020-07-23 NOTE — LETTER
July 23, 2020      Alyssa Marie MD  [address]  [place] [address]            Cancer Center - Radiation Oncology  [address] [facility] [address]  Phone: [phone]  Fax: [phone]          Patient: Mikey Saldaña   MR Number: [id number]   YOB: 1950   Date of Visit: 7/23/2020       Dear Dr. Alyssa Marie:    Thank you for referring Mikey Saldaña to me for evaluation. Attached you will find relevant portions of my assessment and plan of care.    If you have questions, please do not hesitate to call me. I look forward to following Mikey Saldaña along with you.    Sincerely,    Paul Conley III, MD    Enclosure  CC:  No Recipients    If you would like to receive this communication electronically, please contact [email or web address] or [phone] to request more information on As Seen on TV Link access.    For providers and/or their staff who would like to refer a patient to [facility], please contact us through our one-stop-shop provider referral line, [facility], at [phone].    If you feel you have received this communication in error or would no longer like to receive these types of communications, please e-mail [email or web address]

## 2020-07-27 ENCOUNTER — DOCUMENTATION ONLY (OUTPATIENT)
Dept: HEMATOLOGY/ONCOLOGY | Facility: CLINIC | Age: 70
End: 2020-07-27

## 2020-07-27 ENCOUNTER — TELEPHONE (OUTPATIENT)
Dept: RADIOLOGY | Facility: HOSPITAL | Age: 70
End: 2020-07-27

## 2020-07-27 NOTE — NURSING
Spoke with Andrew in cytology at [facility] [phone] to request EBUS pathology results.  He will be faxing me what he has on that specimen today. Dr lucero notified.

## 2020-07-27 NOTE — NURSING
Spoke with patient regarding follow up with  after PET scan.  Together we set up follow up for 7/30/20 at the [place] location.  Pt is agreeable to date time and location.    Oncology Navigation   Intake  Date Worked: 07/27/20     Treatment                  Acuity      Follow Up  No follow-ups on file.

## 2020-07-27 NOTE — NURSING
Spoke with pathology at [facility] this am .  They stated they do not have any pathology from the EBUS from 6/19/2020.  They have sent us what they have from bronchoscopy path from same date 6/19/20. Dr Marie notified   Oncology Navigation   Intake  Date Worked: 07/27/20     Treatment                  Acuity      Follow Up  No follow-ups on file.

## 2020-07-28 ENCOUNTER — DOCUMENTATION ONLY (OUTPATIENT)
Dept: HEMATOLOGY/ONCOLOGY | Facility: CLINIC | Age: 70
End: 2020-07-28

## 2020-07-28 NOTE — NURSING
Received EBUS cytology report from [facility] and scanned into media tab of epic record.  Dr Marie notified.   Oncology Navigation   Intake  Date Worked: 07/28/20     Treatment                  Acuity      Follow Up  No follow-ups on file.

## 2020-07-29 ENCOUNTER — LAB VISIT (OUTPATIENT)
Dept: LAB | Facility: HOSPITAL | Age: 70
End: 2020-07-29
Attending: INTERNAL MEDICINE
Payer: OTHER GOVERNMENT

## 2020-07-29 ENCOUNTER — TELEPHONE (OUTPATIENT)
Dept: HEMATOLOGY/ONCOLOGY | Facility: CLINIC | Age: 70
End: 2020-07-29

## 2020-07-29 DIAGNOSIS — C34.92 PRIMARY LUNG ADENOCARCINOMA, LEFT: ICD-10-CM

## 2020-07-29 PROCEDURE — 82378 CARCINOEMBRYONIC ANTIGEN: CPT

## 2020-07-29 PROCEDURE — 36415 COLL VENOUS BLD VENIPUNCTURE: CPT

## 2020-07-29 NOTE — TELEPHONE ENCOUNTER
Spoke with pts daughter  this am over the phone and discussed the need to reschedule PET scan as pt had not been prepped correctly.  New appt dates and times for PET, Radiation SIM and Dr Marie reviewed with her and she confirmed and agreed to all appt dates times and locations. We also reviewed the PET prep and she stated she would make sure pt is prepped correctly for PET . She understands the need for separate dates for Dr. Marie follow up. She has my direct number to call back with any further concerns. (see below message sent to Dr. Marie. )       ----- Message from Alyssa Marie MD sent at 7/29/2020 10:35 AM CDT -----  Yes that is perfect ty  ----- Message -----  From: Vianey Garza RN  Sent: 7/29/2020  10:26 AM CDT  To: Maite Ziegler LPN, Alyssa Marie MD    Just FYI    This patient showed up for his PET and radiation SIM appts today, however he did not prep correctly for the PET so they did not do it.  The next soonest appt for the PET to be rescheduled to is 8/5/20 at 1:30 PM        The radiation tech is going to try to reschedule the SIM for that date as well, as the patient left the clinic before she could get to him.  He thought that since he didn't prep correctly for the PET that all the other appt would be cancelled too.      I will cancel the appt with dr. Marie for tomorrow and reschedule for the day or so after the PET scan .     Thanks  Vianey

## 2020-07-30 LAB — CEA SERPL-MCNC: 3.7 NG/ML (ref 0–5)

## 2020-08-03 ENCOUNTER — TELEPHONE (OUTPATIENT)
Dept: RADIOLOGY | Facility: HOSPITAL | Age: 70
End: 2020-08-03

## 2020-08-05 ENCOUNTER — HOSPITAL ENCOUNTER (OUTPATIENT)
Dept: RADIOLOGY | Facility: HOSPITAL | Age: 70
Discharge: HOME | End: 2020-08-05
Attending: RADIOLOGY
Payer: OTHER GOVERNMENT

## 2020-08-05 ENCOUNTER — HOSPITAL ENCOUNTER (OUTPATIENT)
Dept: RADIATION THERAPY | Facility: HOSPITAL | Age: 70
Discharge: HOME | End: 2020-08-05
Attending: RADIOLOGY
Payer: OTHER GOVERNMENT

## 2020-08-05 ENCOUNTER — HOSPITAL ENCOUNTER (OUTPATIENT)
Dept: RADIOLOGY | Facility: HOSPITAL | Age: 70
Discharge: HOME | End: 2020-08-05
Attending: INTERNAL MEDICINE
Payer: OTHER GOVERNMENT

## 2020-08-05 DIAGNOSIS — C34.92 PRIMARY LUNG ADENOCARCINOMA, LEFT: ICD-10-CM

## 2020-08-05 PROCEDURE — 77333 RADIATION TREATMENT AID(S): CPT | Mod: 26,,, | Performed by: RADIOLOGY

## 2020-08-05 PROCEDURE — 77333 PR  RADN TREATMENT AID(S) INTERM: ICD-10-PCS | Mod: 26,,, | Performed by: RADIOLOGY

## 2020-08-05 PROCEDURE — 77263 PR  RADIATION THERAPY PLAN COMPLEX: ICD-10-PCS | Mod: ,,, | Performed by: RADIOLOGY

## 2020-08-05 PROCEDURE — 77290 PR  SET RADN THERAPY FIELD COMPLEX: ICD-10-PCS | Mod: 26,,, | Performed by: RADIOLOGY

## 2020-08-05 PROCEDURE — 77290 THER RAD SIMULAJ FIELD CPLX: CPT | Mod: 26,,, | Performed by: RADIOLOGY

## 2020-08-05 PROCEDURE — A9552 F18 FDG: HCPCS

## 2020-08-05 PROCEDURE — 78815 NM PET CT ROUTINE: ICD-10-PCS | Mod: 26,PS,, | Performed by: RADIOLOGY

## 2020-08-05 PROCEDURE — 77333 RADIATION TREATMENT AID(S): CPT | Mod: TC | Performed by: RADIOLOGY

## 2020-08-05 PROCEDURE — 78815 PET IMAGE W/CT SKULL-THIGH: CPT | Mod: 26,PS,, | Performed by: RADIOLOGY

## 2020-08-05 PROCEDURE — 77263 THER RADIOLOGY TX PLNG CPLX: CPT | Mod: ,,, | Performed by: RADIOLOGY

## 2020-08-05 PROCEDURE — 78815 PET IMAGE W/CT SKULL-THIGH: CPT | Mod: TC,PS

## 2020-08-05 PROCEDURE — 77014 HC CT GUIDANCE RADIATION THERAPY FLDS PLACEMENT: CPT | Mod: TC | Performed by: RADIOLOGY

## 2020-08-05 PROCEDURE — 77290 THER RAD SIMULAJ FIELD CPLX: CPT | Mod: TC | Performed by: RADIOLOGY

## 2020-08-06 ENCOUNTER — TELEPHONE (OUTPATIENT)
Dept: HEMATOLOGY/ONCOLOGY | Facility: CLINIC | Age: 70
End: 2020-08-06

## 2020-08-07 ENCOUNTER — TELEPHONE (OUTPATIENT)
Dept: HEMATOLOGY/ONCOLOGY | Facility: CLINIC | Age: 70
End: 2020-08-07

## 2020-08-07 ENCOUNTER — OFFICE VISIT (OUTPATIENT)
Dept: HEMATOLOGY/ONCOLOGY | Facility: CLINIC | Age: 70
End: 2020-08-07
Payer: OTHER GOVERNMENT

## 2020-08-07 VITALS
WEIGHT: 114.63 LBS | BODY MASS INDEX: 16.41 KG/M2 | TEMPERATURE: 98 F | HEIGHT: 70 IN | SYSTOLIC BLOOD PRESSURE: 134 MMHG | HEART RATE: 93 BPM | DIASTOLIC BLOOD PRESSURE: 87 MMHG | OXYGEN SATURATION: 94 % | RESPIRATION RATE: 16 BRPM

## 2020-08-07 DIAGNOSIS — C34.92 PRIMARY LUNG ADENOCARCINOMA, LEFT: Primary | ICD-10-CM

## 2020-08-07 DIAGNOSIS — K63.9 SMALL BOWEL LESION: ICD-10-CM

## 2020-08-07 PROCEDURE — 99215 OFFICE O/P EST HI 40 MIN: CPT | Mod: PBBFAC | Performed by: INTERNAL MEDICINE

## 2020-08-07 PROCEDURE — 99215 OFFICE O/P EST HI 40 MIN: CPT | Mod: S$PBB,,, | Performed by: INTERNAL MEDICINE

## 2020-08-07 PROCEDURE — 99999 PR PBB SHADOW E&M-EST. PATIENT-LVL V: CPT | Mod: PBBFAC,,, | Performed by: INTERNAL MEDICINE

## 2020-08-07 PROCEDURE — 99215 PR OFFICE/OUTPT VISIT, EST, LEVL V, 40-54 MIN: ICD-10-PCS | Mod: S$PBB,,, | Performed by: INTERNAL MEDICINE

## 2020-08-07 PROCEDURE — 99999 PR PBB SHADOW E&M-EST. PATIENT-LVL V: ICD-10-PCS | Mod: PBBFAC,,, | Performed by: INTERNAL MEDICINE

## 2020-08-07 NOTE — PLAN OF CARE
DISCONTINUE ON PATHWAY REGIMEN - Non-Small Cell Lung    No Medical Intervention - Off Treatment.    REASON: Other Reason  PRIOR TREATMENT: [id number]: Referral to Radiation Oncology    START ON PATHWAY REGIMEN - Non-Small Cell Lung    [id number]        Paclitaxel (Taxol)       Carboplatin (Paraplatin)           Additional Orders: * All AUC calculations intended to be used in [place]   formula    **Always confirm dose/schedule in your pharmacy ordering system**    Patient Characteristics:  Stage III - Unresectable, PS = 0, 1  AJCC T Category: T4  Current Disease Status: No Distant Mets or Local Recurrence  AJCC N Category: N1  AJCC M Category: M0  AJCC 8 Stage Grouping: IIIA  ECOG Performance Status: 1  Intent of Therapy:  Non-Curative / Palliative Intent, Discussed with Patient

## 2020-08-07 NOTE — PROGRESS NOTES
"  Subjective:      DATE OF VISIT: 8/7/20     ?  Patient ID:?Mikey Saldaña is a 69 y.o. male.?? MR#: [id number]   ?   PRIMARY ONCOLOGIST: Dr. Marie    ? Primary Care Providers:  [facility] - * (General)     CHIEF COMPLAINT:  Follow-up  ?   ONCOLOGIC DIAGNOSIS:  Lung adenocarcinoma  ?   CURRENT TREATMENT: TBD    PAST TREATMENT: none  ?   ONCOLOGIC HISTORY:   ?   Mr. Saldaña is a 69-year-old man [employer] , current tobacco user of 40 pack years, type 2 diabetes, hypertension, hepatitis-B, elevated PSA, carotid artery stenosis, peripheral vascular disease, osteoporosis, depression, PTSD.    [facility] due to growth of suspicious nodule on CT, followed up by PET-CT.    03/13/2020 PET-CT showed "moderate FDG uptake at right lower lobe noncalcified pulmonary nodule measuring 1.2 x 0.9 cm, which is suspicious for malignancy with a differential also including sequelae from an infectious/inflammatory etiology.  No definite moderate or intensely avid FDG avid suspicious lymph node.  2. focal intense FDG uptake at right transverse colon which localizes with bowel wall soft tissue density measuring 2.0 x 2.8 cm which is suspicious for colonic neoplasia which could be a malignant or benign.  3.  Aftormentioned focal intense FDG uptake at right transverse colon immediately abuts the margin of the right liver lobe anteriorly for which involvement of the liver cannot be completely excluded."  In body of radiology report notes  RLL pulmonary nodule SUV 3.7, R hilar  2.6, L hilar 2.3, precarinal lymph node 1.3x1.5.     RLL lesion outside pathology report notes:  " lepidic non mucinous adenocarcinoma consistent with lung primary. Positive for napsin A, TTF1 CK5/6 and CK7, negative for CK 20,  p40 and p63."    June 2020 EBUS at [facility]. Per a clinic note (do not have pathology report) EBUS TBNA of 11 L with rare atypical cells.  4L, 4R and 11R negative for malignancy.  "     5/26/20 colonoscopy at [facility] report noted 2 sessile transverse colon polyps.  Pathology from outside noting tubular adenoma.    07/23/2020 MRI BRAIN at [facility] negative for evidence of metastatic disease    INTERVAL EVENTS    He is accompanied by his daughter to review results of repeat PET-CT.  He continues to feel well and denies any symptoms including anorexia, weight loss, dyspnea, chest or other pain, hemoptysis, hematemesis.        Review of Systems    ?   A comprehensive 14-point review of systems was reviewed with patient and was negative other than as specified above.   ?   Objective:      Physical Exam      ?   Vitals:    08/07/20 1129   BP: 134/87   Pulse: 93   Resp: 16   Temp: 98.2 °F (36.8 °C)      ?ECOG:?1   General appearance: Generally well appearing, in no acute distress.   Head, eyes, ears, nose, and throat: moist mucous membranes.   Respiratory:  Normal work of breathing  Abdomen: nontender, nondistended.   Extremities: Warm, without edema.   Neurologic: Alert and oriented. Grossly normal strength, coordination, and gait.   Skin: No rashes, ecchymoses or petechial lesion.   Psychiatric:  Normal mood and affect.    Laboratory:  ?   No visits with results within 1 Day(s) from this visit.   Latest known visit with results is:   Lab Visit on 07/29/2020   Component Date Value Ref Range Status    CEA 07/29/2020 3.7  0.0 - 5.0 ng/mL Final      ?   Lab Results   Component Value Date    WBC 4.72 07/16/2020    HGB 13.1 (L) 07/16/2020    HCT 42.3 07/16/2020    MCV 90 07/16/2020     07/16/2020       CMP  Sodium   Date Value Ref Range Status   07/16/2020 143 136 - 145 mmol/L Final     Potassium   Date Value Ref Range Status   07/16/2020 4.4 3.5 - 5.1 mmol/L Final     Chloride   Date Value Ref Range Status   07/16/2020 108 95 - 110 mmol/L Final     CO2   Date Value Ref Range Status   07/16/2020 24 23 - 29 mmol/L Final     Glucose   Date Value Ref Range Status   07/16/2020 106 70 - 110 mg/dL Final      BUN, Bld   Date Value Ref Range Status   07/16/2020 18 8 - 23 mg/dL Final     Creatinine   Date Value Ref Range Status   07/16/2020 1.6 (H) 0.5 - 1.4 mg/dL Final     Calcium   Date Value Ref Range Status   07/16/2020 9.5 8.7 - 10.5 mg/dL Final     Total Protein   Date Value Ref Range Status   07/16/2020 8.1 6.0 - 8.4 g/dL Final     Albumin   Date Value Ref Range Status   07/16/2020 3.5 3.5 - 5.2 g/dL Final     Total Bilirubin   Date Value Ref Range Status   07/16/2020 0.3 0.1 - 1.0 mg/dL Final     Comment:     For infants and newborns, interpretation of results should be based  on gestational age, weight and in agreement with clinical  observations.  Premature Infant recommended reference ranges:  Up to 24 hours.............<8.0 mg/dL  Up to 48 hours............<12.0 mg/dL  3-5 days..................<15.0 mg/dL  6-29 days.................<15.0 mg/dL       Alkaline Phosphatase   Date Value Ref Range Status   07/16/2020 95 55 - 135 U/L Final     AST   Date Value Ref Range Status   07/16/2020 35 10 - 40 U/L Final     ALT   Date Value Ref Range Status   07/16/2020 16 10 - 44 U/L Final     Anion Gap   Date Value Ref Range Status   07/16/2020 11 8 - 16 mmol/L Final     eGFR if    Date Value Ref Range Status   07/16/2020 49 (A) >60 mL/min/1.73 m^2 Final     eGFR if non    Date Value Ref Range Status   07/16/2020 43 (A) >60 mL/min/1.73 m^2 Final     Comment:     Calculation used to obtain the estimated glomerular filtration  rate (eGFR) is the CKD-EPI equation.          ?   Imaging:  ?  8/5/20  NM PET CT ROUTINE     CLINICAL HISTORY:  treatment planning/staging lung cancer;  Malignant neoplasm of unspecified part of left bronchus or lung     TECHNIQUE:  Segmented attenuation corrected 3-D PET imaging was obtained from the skull base through the mid thighs utilizing 12.35 mCi F-18-FDG.  Noncontrast CT imaging was performed for attenuation correction, diagnosis, and anatomical fusion with  PET.     COMPARISON:  03/13/2020 from outside institution ([facility]).     FINDINGS:  Head/neck: There is normal physiologic FDG uptake noted within the visualized brain parenchyma. No FDG avid lymphadenopathy within the neck.     Chest: A 15 mm nodule in the superior segment right lower lobe appears minimally larger exhibiting an SUV max of 4.9.  There is a new 14 mm irregular nodular density in the anterior right upper lobe with an SUV max of 3.2.  Several subcentimeter weakly FDG avid nodular densities and low-grade ground-glass opacities noted in the adjacent right upper lobe, also new since prior.  There is chronic volume loss of the right middle lobe and stable non FDG avid perihilar parenchymal opacity.  Additional reticulonodular parenchymal opacities in the right lower lobe remain unchanged and non FDG avid.  There is FDG avid lymphadenopathy in the precarinal space with SUV max of 2.8 and in the right hilum with SUV max of 3.1, not significantly changed from prior.  There are granulomatous calcifications in the subcarinal space, right hilum, and right upper lobe.  No pleural effusion.Aortic and coronary atherosclerosis.     Abdomen/Pelvis: Normal physiologic FDG uptake noted within the liver, spleen, urinary tract, and bowel.No FDG avid retroperitoneal lymph nodes.  Air within the left biliary tree unchanged from prior.  Extensive aortoiliac atherosclerosis.     Skeletal: No FDG avid osseus lesions identified.  Postsurgical changes in the right hip.     Impression:     1. Mild enlargement of FDG avid right lower lobe lung nodule and multiple newly developed FDG avid right upper lobe nodules concerning for disease progression.  2. No significant change in FDG avid mediastinal and right hilar lymphadenopathy.          MRI BRAIN W WO CONTRAST     CLINICAL HISTORY:  Malignant neoplasm of unspecified part of left bronchus or lungstaging;     TECHNIQUE:  Sagittal T1. Axial T1, T2, T2 FLAIR, GRE,  "DWI.  Axial and coronal T1 post contrast.     COMPARISON:  None available.     FINDINGS:  There is no restricted diffusion. Minimal patchy T2 FLAIR hyperintensity within the periventricular and deep white matter bilaterally.  No abnormal enhancement.     The ventricles and sulci are normal in size and configuration. Midline structures are normal. There are preserved arterial flow-voids on T2 weighted imaging. Bilateral mastoid tip effusions.  The paranasal sinuses are clear.     No abnormal marrow signal is identified.     Impression:     1. No evidence of metastatic disease.  2. White matter T2 alteration consistent with mild chronic microvascular ischemia.     Pathology:    I have reviewed the patient's medical history in detail and updated the computerized patient record.    ?   Assessment/Plan:       1. Primary lung adenocarcinoma, left        Plan:     # right lower lobe adenocarcinoma, stage T4N1M0:  I reviewed in detail available outside records which is notable for 1.2 cm right lower lobe lesion biopsy proven adenocarcinoma most consistent with lung primary per outside pathology report noting "lipidic non mucinous Positive for napsin A, TTF1 CK5/6 and CK7, negative for CK 20,  p40 and p63."  Unclear mediastinal lymph node involvement.  PET shows mild avidity in bilateral hilar (SUV 2.6 and 2.3 right and left respectively) and enlarged precarinal lymph node 1.3 x 1.5 cm EBUS and precarinal lymph nodes SUV, [facility]: 1.2 cm FDG avid lesion in right lower lobe without reported avid lymph node.  Right lower lobe lesion  biopsy positive for lepidic adenocarcinoma" EBUS procedure report without notable in large mint noted less than or equal to 1 cm; pathology per a clinic note showed 11L rare atypical cells, 4R, 4L and 11R without malignancy.    Given his presentation with us several months after initial PET-CT I have obtain repeat and reviewed in detail along with our radiologist and I have shared " with him and daughter during clinic visit today.  Unfortunately there has been interval progressive increase in SUV avidity of primary right lower lobe lesion and interval development of multiple additional nodules throughout right upper lobe.     I had extensive conversation with patient and daughter.  While technically T4 disease with disease involvement on ipsilateral lung, the extent of interval development of nodules throughout right upper lung and primary right lower lung would not lend itself well to encompassing within chemoradiation treatment per my discussion with Dr. Conley in Radiation Oncology who is in agreement with my recommendation to proceed with systemic therapy and restaging after a couple cycles.  I discussed at length options for systemic therapy including targeted therapy, immunotherapy and chemotherapy combinations.  I have sent request to have mutational testing on tissue obtained at [facility] which is pending.  As these tests will take several weeks at least I discussed either waiting for mutational testing to return versus initiating treatment with initial combination chemotherapy with starting now may be favored due to concern for delay and further progression versus waiting for tests to return; he was interested in proceeding with therapy understanding potential changing regimen per results of mutational testing/PD-L1 status.  Patient also asked about prognosis should he not pursue treatment. I did discuss option of palliative treatment should he not wish active cancer directed therapy and hospice options, or in future should treatment have intolerable toxicity or progression. I encouraged palliative care consultation to discuss goals of care, advance care planning. He is in agreement with starting treatment with chemotherapy understanding that should mutational testing reveal alternative targeted/immunotherapy option we will switch at that time.  We will arrange for teaching/consent and  "initiation of therapy as soon as possible per patient preference if possible pending insurance authorization Monday 08/17/2020.    # colonic lesion/possible liver abnormality:  Per outside records  "focal intense FDG uptake at right transverse colon which localizes with bowel wall soft tissue density measuring 2.0 x 2.8 cm which is suspicious for colonic neoplasia which could be a malignant or benign.  3.  Aftormentioned focal intense FDG uptake at right transverse colon immediately abuts the margin of the right liver lobe anteriorly for which involvement of the liver cannot be completely excluded."  Discussed importance of excluding 2nd neoplasm or metastatic disease in colon and or liver.   - repeat PET-CT  I have reviewed with Radiology prior outside imaging with right transverse colon soft tissue abnormality which is no longer apparent, felt less likely associated with his known lung malignancy, understanding limitations of FDG avidity of bowel however colonoscopy on outside only noted sessile polyps tubular adenoma. CEA last week negative. Will continue to monitor in future imaging.     # current tobacco user:  40 pack-year smoker, COPD, tenuous functional status and will need to take into account with treatment planning.      Advance Care Planning     Referral to palliative care       Follow-Up:   Chemo teaching/consent next week  Per patient preference if insurance authorization allows cycle 1 day 1 systemic therapy on Monday 08/17/2020 with CBC, CMP and UA prior  Request for [facility] lung tumor tissue to be sent for mutational testing and STRATA if enough tissue  Palliative care consultation        "

## 2020-08-07 NOTE — PLAN OF CARE
Non-Small Cell Lung - No Medical Intervention - Off Treatment.    Patient Characteristics:  Stage III - Unresectable, PS ? 2  AJCC T Category: T4  Current Disease Status: No Distant Mets or Local Recurrence  AJCC N Category: N1  AJCC M Category: M0  AJCC 8 Stage Grouping: IIIA  ECOG Performance Status: 2

## 2020-08-07 NOTE — TELEPHONE ENCOUNTER
----- Message from Eva Arnold sent at 8/7/2020  7:36 AM CDT -----  ms ammon/daughter needs call back regarding status of [facility] paperwork..[phone]

## 2020-08-07 NOTE — PATIENT INSTRUCTIONS
Chemo teaching/consent next week  Per patient preference if insurance authorization allows cycle 1 day 1 systemic therapy on Monday 08/17/2020 with CBC, CMP and UA prior  Request for [facility] lung tumor tissue to be sent for mutational testing and STRATA if enough tissue  Palliative care consultation

## 2020-08-07 NOTE — PLAN OF CARE
DISCONTINUE ON PATHWAY REGIMEN - Non-Small Cell Lung    [id number]        Paclitaxel (Taxol)       Carboplatin (Paraplatin)           Additional Orders: * All AUC calculations intended to be used in [place]   formula    **Always confirm dose/schedule in your pharmacy ordering system**    REASON: Other Reason  PRIOR TREATMENT: [id number]: Carboplatin AUC=6 + Paclitaxel 200 mg/m2 q21 Days x 2   Cycles    START ON PATHWAY REGIMEN - Non-Small Cell Lung    [id number]        Paclitaxel (Taxol)       Carboplatin (Paraplatin)       Bevacizumab-xxxx           Additional Orders: * All AUC calculations intended to be used in [place]   formula    **Always confirm dose/schedule in your pharmacy ordering system**    Patient Characteristics:  Stage IV Metastatic, Nonsquamous, Initial Chemotherapy/Immunotherapy, PS = 0, 1,   ALK or EGFR or ROS1 or NTRK Genomic Alterations - Awaiting Test Results  AJCC T Category: T4  Current Disease Status: Distant Metastases  AJCC N Category: N1  AJCC M Category: M1a  AJCC 8 Stage Grouping: ROSLYN  Histology: Nonsquamous Cell  ROS1 Rearrangement Status: Awaiting Test Results  T790M Mutation Status: Not Applicable - EGFR Mutation Negative/Unknown  Other Mutations/Biomarkers: No Other Actionable Mutations  NTRK Gene Fusion Status: Awaiting Test Results  PD-L1 Expression Status: Awaiting Test Results  Chemotherapy/Immunotherapy LOT: Initial Chemotherapy/Immunotherapy  Molecular Targeted Therapy: Not Appropriate  ALK Rearrangement Status: Awaiting Test Results  EGFR Mutation Status: Awaiting Test Results  BRAF V600E Mutation Status: Awaiting Test Results  ECOG Performance Status: 1  Intent of Therapy:  Non-Curative / Palliative Intent, Discussed with Patient

## 2020-08-09 DIAGNOSIS — Z03.818 ENCOUNTER FOR OBSERVATION FOR SUSPECTED EXPOSURE TO OTHER BIOLOGICAL AGENTS RULED OUT: ICD-10-CM

## 2020-08-10 ENCOUNTER — RESEARCH ENCOUNTER (OUTPATIENT)
Dept: HEMATOLOGY/ONCOLOGY | Facility: CLINIC | Age: 70
End: 2020-08-10

## 2020-08-10 ENCOUNTER — DOCUMENTATION ONLY (OUTPATIENT)
Dept: PALLIATIVE MEDICINE | Facility: CLINIC | Age: 70
End: 2020-08-10

## 2020-08-10 ENCOUNTER — DOCUMENTATION ONLY (OUTPATIENT)
Dept: HEMATOLOGY/ONCOLOGY | Facility: CLINIC | Age: 70
End: 2020-08-10

## 2020-08-10 ENCOUNTER — TELEPHONE (OUTPATIENT)
Dept: HEMATOLOGY/ONCOLOGY | Facility: CLINIC | Age: 70
End: 2020-08-10

## 2020-08-10 NOTE — TELEPHONE ENCOUNTER
I called patient and spoke with daughter returned her call.  Patient after discussing with family is having 2nd thoughts about proceeding with chemotherapy at this time.  I discussed again option of waiting until all mutational testing and PDL1 status returns to make decision about treatment plan; they would feel most comfortable with this approach at this time.  I have alerted our staff of this change and will hold therapy until then and schedule follow-up with me after mutational/PDL1 testing returns.

## 2020-08-10 NOTE — PROGRESS NOTES
Palliative Care:    Spoke with daughter, , regarding consult with Dr. Arzate.  She is very interested in her father having a virtual appointment with Dr. Arzate but would like to wait until they have more diagnostic information and understanding of his treatment options.  She states that he is very active and is concerned about limitations resulting from infusions/therapy.  She requested that I call back in 2 weeks.  I have placed a reminder to do so and will send my direct line via patient portal in case they have any questions or want to schedule sooner.    Farzaneh Ramos RN  Palliative Care

## 2020-08-10 NOTE — NURSING
Received messages from Dr. Marie today regarding a change in this patient's treatment plan . While attempting to schedule chemotherapy coordinated appts. I spoke with pt's daughter  with his permission .  She stated that we need to hold off on setting anything up for pt as he is unsure whether he wants chemotherapy or not.  They have asked for DR. Marie to give them a call to discuss options and the daughter states that she will then get back with me for scheduling .  We reviewed my role as nurse navigator and she has my direct contact number to call me back once a decision has been made.  I have requested DR. Marie to call them.  I will follow patient.   Oncology Navigation   Intake  Cancer Type: Thoracic  MD Assigned: jazmine  Initial Nurse Navigator Contact: 08/10/20  Contact Method: Pool basket  Date Worked: 08/10/20  First Appointment Available: 20  Appointment Date: 20  Schedule to Appointment Timeline (days): 3  First Available Date vs. Scheduled Date (days): 0  Reason for Treatment Delay: Other (pt deciding whether he wants chemo / considering options)     Treatment  Treatment Type(s): Chemotherapy  Chemotherapy Regimen: Carboplatin, Alimta poss Keytruda              Support Systems: Children  Barriers of Care: Other  Other Barriers: pt deciding whether he wants treatment or not, considering options      Acuity  Systemic Treatment - predicted or initiated: Chemotherapy regimen with multiple drugs (+1)  Treatment Tolerability: Has not started treatment yet/treatment fully completed and side effects resolved  ECO  Comorbidities in Medical History: 1   Needed: 0  Support: 0  Verbalizes the need for more education: 1  Navigation Acuity: 6     Follow Up  No follow-ups on file.

## 2020-08-10 NOTE — PLAN OF CARE
DISCONTINUE ON PATHWAY REGIMEN - Non-Small Cell Lung    [id number]        Paclitaxel (Taxol)       Carboplatin (Paraplatin)       Bevacizumab-xxxx           Additional Orders: * All AUC calculations intended to be used in [place]   formula    **Always confirm dose/schedule in your pharmacy ordering system**    REASON: Other Reason  PRIOR TREATMENT: [id number]: Carboplatin AUC=6 + Paclitaxel 200 mg/m2 + Bevacizumab   15 mg/kg q21 Days x 1 Cycle  TREATMENT RESPONSE: Unable to Evaluate    START ON PATHWAY REGIMEN - Non-Small Cell Lung    [id number]        Pemetrexed (Alimta)       Carboplatin (Paraplatin)           Additional Orders: Note: Patient to receive the following prior to the   initiation of therapy:  1) Dexamethasone 4 mg orally twice daily x 6 doses.    First dose 24 hours before chemotherapy.  2) Folic acid ? 400 mcg orally daily.    First dose at least 5 days prior to the first dose of pemetrexed.  3) Vitamin   B12 1,000 mcg intramuscularly every 9 weeks.  First dose at least 5 days prior   to the first dose of pemetrexed.    All AUC calculations intended to be used in   [place] Formula    **Always confirm dose/schedule in your pharmacy ordering system**    Patient Characteristics:  Stage IV Metastatic, Nonsquamous, Initial Chemotherapy/Immunotherapy, PS = 0, 1,   ALK or EGFR or ROS1 or NTRK Genomic Alterations - Awaiting Test Results  AJCC T Category: T4  Current Disease Status: Distant Metastases  AJCC N Category: N1  AJCC M Category: M0  AJCC 8 Stage Grouping: IIIA  Histology: Nonsquamous Cell  ROS1 Rearrangement Status: Awaiting Test Results  T790M Mutation Status: Not Applicable - EGFR Mutation Negative/Unknown  Other Mutations/Biomarkers: No Other Actionable Mutations  NTRK Gene Fusion Status: Awaiting Test Results  PD-L1 Expression Status: Awaiting Test Results  Chemotherapy/Immunotherapy LOT: Initial Chemotherapy/Immunotherapy  Molecular Targeted Therapy: Not Appropriate  ALK Rearrangement Status: Awaiting  Test Results  EGFR Mutation Status: Awaiting Test Results  BRAF V600E Mutation Status: Awaiting Test Results  ECOG Performance Status: 1  Intent of Therapy:  Non-Curative / Palliative Intent, Discussed with Patient

## 2020-08-10 NOTE — PROGRESS NOTES
"Spoke to Andrew in the [facility] Pathology department [phone] about the additional pathology tests that Dr. Marie requested. He stated that as soon as the pathologist approves the tests (which he said hopefully will happen today), he will cut the slides and send the to "[employer]." He said the tests take 10-14 days.  "

## 2020-08-11 ENCOUNTER — DOCUMENTATION ONLY (OUTPATIENT)
Dept: HEMATOLOGY/ONCOLOGY | Facility: CLINIC | Age: 70
End: 2020-08-11

## 2020-08-11 NOTE — PROGRESS NOTES
Pathology labs in [facility] called regarding lab test of most to lest priority due to limited sample collected dr lucero listed test pdl 1, egfr, alk1, ros, braf. And any additional samples collect dr lucero stated she would have like all test but not enough sample was collected, per [facility] pathology lab.

## 2020-08-17 ENCOUNTER — DOCUMENTATION ONLY (OUTPATIENT)
Dept: HEMATOLOGY/ONCOLOGY | Facility: CLINIC | Age: 70
End: 2020-08-17

## 2020-08-17 NOTE — NURSING
Spoke with patient's daughter today to explain length of time it was taking to get results from send out mutational studies on pt with [facility].  We have expedited the results , however I have spoken to Andrew with pathology at [facility] and he stated that he was expecting results anywhere from 20-20 time frame. The pt and she are good with this plan and I informed her that I will follow this and get the pt back in for follow up once we receive results.  Dr. Marie made aware.  Pt states is feeling well now with no complaints at this time.   Oncology Navigation   Intake  Cancer Type: Thoracic  MD Assigned: jazmine  Initial Nurse Navigator Contact: 08/10/20  Contact Method: Pool basket  Date Worked: 20  First Appointment Available: 20  Appointment Date: 20  Schedule to Appointment Timeline (days): 3  First Available Date vs. Scheduled Date (days): 0  Reason for Treatment Delay: Other (pt deciding whether he wants chemo / considering options)     Treatment  Treatment Type(s): Chemotherapy  Chemotherapy Regimen: Carboplatin, Alimta poss Keytruda              Support Systems: Children  Barriers of Care: Other  Other Barriers: pt deciding whether he wants treatment or not, considering options      Acuity  Systemic Treatment - predicted or initiated: Chemotherapy regimen with multiple drugs (+1)  Treatment Tolerability: Has not started treatment yet/treatment fully completed and side effects resolved  ECO  Comorbidities in Medical History: 1   Needed: 0  Support: 0  Verbalizes the need for more education: 1  Navigation Acuity: 6     Follow Up  Follow up in about 4 days (around 2020).

## 2020-08-21 ENCOUNTER — TELEPHONE (OUTPATIENT)
Dept: HEMATOLOGY/ONCOLOGY | Facility: CLINIC | Age: 70
End: 2020-08-21

## 2020-08-21 ENCOUNTER — DOCUMENTATION ONLY (OUTPATIENT)
Dept: HEMATOLOGY/ONCOLOGY | Facility: CLINIC | Age: 70
End: 2020-08-21

## 2020-08-21 NOTE — NURSING
Received mutation study results from [facility] send out.  Reported to KP Marie (hard copy left with her)  and scanned into media section of medical record.  LM for pt to call back regarding follow up with Dr. Marie. Direct number left in message. Will follow pt.   Oncology Navigation   Intake  Cancer Type: Thoracic  MD Assigned: jazmine  Initial Nurse Navigator Contact: 08/10/20  Contact Method: Pool basket  Date Worked: 20  First Appointment Available: 20  Appointment Date: 20  Schedule to Appointment Timeline (days): 3  First Available Date vs. Scheduled Date (days): 0  Reason for Treatment Delay: Other (pt deciding whether he wants chemo / considering options)     Treatment  Treatment Type(s): Chemotherapy  Chemotherapy Regimen: Carboplatin, Alimta poss Keytruda              Support Systems: Children  Barriers of Care: Other  Other Barriers: pt deciding whether he wants treatment or not, considering options      Acuity  Systemic Treatment - predicted or initiated: Chemotherapy regimen with multiple drugs (+1)  Treatment Tolerability: Has not started treatment yet/treatment fully completed and side effects resolved  ECO  Comorbidities in Medical History: 1   Needed: 0  Support: 0  Verbalizes the need for more education: 1  Navigation Acuity: 6     Follow Up  No follow-ups on file.

## 2020-08-24 ENCOUNTER — TELEPHONE (OUTPATIENT)
Dept: HEMATOLOGY/ONCOLOGY | Facility: CLINIC | Age: 70
End: 2020-08-24

## 2020-08-24 NOTE — TELEPHONE ENCOUNTER
Pt daughter returned my call per pt request to set up follow up with dr Marie.  She stated that she is out of town and will not be back until the end of this week .  Together we set up 8/28/20 at 9am at the [place] location for follow up with dr Marie. She stated that she is the only one who can bring him to the appts.  She has my direct number to call me back with any further concerns in the meantime.

## 2020-08-24 NOTE — TELEPHONE ENCOUNTER
Spoke with pt this am over the phone and he states he is feeling fine.  When I asked him to schedule a return appt for KP Marie he asked that I wait and speak with his daughter  to schedule.  I have left a message on 's voice mail as well. Will set up appt once I speak to daughter as per pt wishes. My direct number left in message.

## 2020-08-24 NOTE — TELEPHONE ENCOUNTER
----- Message from Flori Allen sent at 8/24/2020  9:32 AM CDT -----  Regarding: Call back  Contact: Pt daughter / Esperanza Lutz is returning the staff call    Pt call back [phone]    thanks

## 2020-08-26 ENCOUNTER — TELEPHONE (OUTPATIENT)
Dept: HEMATOLOGY/ONCOLOGY | Facility: CLINIC | Age: 70
End: 2020-08-26

## 2020-08-26 ENCOUNTER — DOCUMENTATION ONLY (OUTPATIENT)
Dept: HEMATOLOGY/ONCOLOGY | Facility: CLINIC | Age: 70
End: 2020-08-26

## 2020-08-26 NOTE — TELEPHONE ENCOUNTER
Pt  Daughter  called to clarify appt changes.  Together we verified the appt for 8/28/20 at 9am at the cancer center location. Pt will attend

## 2020-08-26 NOTE — PROGRESS NOTES
Due to changes in providers scheduled, pt was notified his appointment Friday 8- will be at the cancer center, and not at the [facility]. Pt verbalized understanding.

## 2020-08-28 ENCOUNTER — OFFICE VISIT (OUTPATIENT)
Dept: HEMATOLOGY/ONCOLOGY | Facility: CLINIC | Age: 70
End: 2020-08-28
Payer: OTHER GOVERNMENT

## 2020-08-28 ENCOUNTER — DOCUMENTATION ONLY (OUTPATIENT)
Dept: HEMATOLOGY/ONCOLOGY | Facility: CLINIC | Age: 70
End: 2020-08-28

## 2020-08-28 VITALS
TEMPERATURE: 98 F | HEIGHT: 70 IN | BODY MASS INDEX: 16.32 KG/M2 | DIASTOLIC BLOOD PRESSURE: 96 MMHG | SYSTOLIC BLOOD PRESSURE: 146 MMHG | OXYGEN SATURATION: 94 % | HEART RATE: 79 BPM | WEIGHT: 114 LBS

## 2020-08-28 DIAGNOSIS — Z72.0 TOBACCO ABUSE: ICD-10-CM

## 2020-08-28 DIAGNOSIS — J44.9 CHRONIC OBSTRUCTIVE PULMONARY DISEASE, UNSPECIFIED COPD TYPE: ICD-10-CM

## 2020-08-28 DIAGNOSIS — C34.91 PRIMARY LUNG ADENOCARCINOMA, RIGHT: Primary | ICD-10-CM

## 2020-08-28 DIAGNOSIS — E11.9 TYPE 2 DIABETES MELLITUS WITHOUT COMPLICATION, WITHOUT LONG-TERM CURRENT USE OF INSULIN: ICD-10-CM

## 2020-08-28 PROCEDURE — 99214 OFFICE O/P EST MOD 30 MIN: CPT | Mod: PBBFAC | Performed by: INTERNAL MEDICINE

## 2020-08-28 PROCEDURE — 99999 PR PBB SHADOW E&M-EST. PATIENT-LVL IV: ICD-10-PCS | Mod: PBBFAC,,, | Performed by: INTERNAL MEDICINE

## 2020-08-28 PROCEDURE — 99215 PR OFFICE/OUTPT VISIT, EST, LEVL V, 40-54 MIN: ICD-10-PCS | Mod: S$PBB,,, | Performed by: INTERNAL MEDICINE

## 2020-08-28 PROCEDURE — 99215 OFFICE O/P EST HI 40 MIN: CPT | Mod: S$PBB,,, | Performed by: INTERNAL MEDICINE

## 2020-08-28 PROCEDURE — 99999 PR PBB SHADOW E&M-EST. PATIENT-LVL IV: CPT | Mod: PBBFAC,,, | Performed by: INTERNAL MEDICINE

## 2020-08-28 NOTE — PROGRESS NOTES
"  Subjective:      DATE OF VISIT: 8/28/20     ?  Patient ID:?Mikey Saldaña is a 69 y.o. male.?? MR#: [id number]   ?   PRIMARY ONCOLOGIST: Dr. Marie    ? Primary Care Providers:  [facility] - * (General)     CHIEF COMPLAINT:  Follow-up  ?   ONCOLOGIC DIAGNOSIS:  Lung adenocarcinoma, cT4 cN1 cM0 with extensive ipsilateral lung involvement, PD-L1 85%, negative for EGFR, ROS, ALK, BRAF mutations  ?   CURRENT TREATMENT: TBD    PAST TREATMENT: none  ?   ONCOLOGIC HISTORY:   ?   Mr. Saldaña is a 69-year-old man [employer] , current tobacco user of 40 pack years, type 2 diabetes, hypertension, hepatitis-B, elevated PSA, carotid artery stenosis, peripheral vascular disease, osteoporosis, depression, PTSD.    [facility] due to growth of suspicious nodule on CT, followed up by PET-CT.    03/13/2020 PET-CT showed "moderate FDG uptake at right lower lobe noncalcified pulmonary nodule measuring 1.2 x 0.9 cm, which is suspicious for malignancy with a differential also including sequelae from an infectious/inflammatory etiology.  No definite moderate or intensely avid FDG avid suspicious lymph node.  2. focal intense FDG uptake at right transverse colon which localizes with bowel wall soft tissue density measuring 2.0 x 2.8 cm which is suspicious for colonic neoplasia which could be a malignant or benign.  3.  Aftormentioned focal intense FDG uptake at right transverse colon immediately abuts the margin of the right liver lobe anteriorly for which involvement of the liver cannot be completely excluded."  In body of radiology report notes  RLL pulmonary nodule SUV 3.7, R hilar  2.6, L hilar 2.3, precarinal lymph node 1.3x1.5.     RLL lesion outside pathology report notes:  " lepidic non mucinous adenocarcinoma consistent with lung primary. Positive for napsin A, TTF1 CK5/6 and CK7, negative for CK 20,  p40 and p63."    June 2020 [facility]US at [facility]. Per a clinic note (do not have " pathology report) EBUS TBNA of 11 L with rare atypical cells.  4L, 4R and 11R negative for malignancy.      5/26/20 colonoscopy at [place] report noted 2 sessile transverse colon polyps.  Pathology from outside noting tubular adenoma.    07/23/2020 MRI BRAIN at [facility] negative for evidence of metastatic disease    8/5/20 PET Mild enlargement of FDG avid right lower lobe lung nodule and multiple newly developed FDG avid right upper lobe nodules concerning for disease progression.    INTERVAL EVENTS    He is accompanied by his daughter to review results of repeat PET-CT.  He continues to feel well and denies any symptoms including anorexia, weight loss, dyspnea, chest or other pain, hemoptysis, hematemesis.        Review of Systems    ?   A comprehensive 14-point review of systems was reviewed with patient and was negative other than as specified above.   ?   Objective:      Physical Exam      ?   Vitals:    08/28/20 0917   BP: (!) 146/96   Pulse: 79   Temp: 98.1 °F (36.7 °C)      ?ECOG:?1   General appearance: Generally well appearing, in no acute distress.   Head, eyes, ears, nose, and throat: moist mucous membranes.   Respiratory:  Normal work of breathing  Abdomen: nontender, nondistended.   Extremities: Warm, without edema.   Neurologic: Alert and oriented. Grossly normal strength, coordination, and gait.   Skin: No rashes, ecchymoses or petechial lesion.   Psychiatric:  Normal mood and affect.    Laboratory:  ?   No visits with results within 1 Day(s) from this visit.   Latest known visit with results is:   Lab Visit on 07/29/2020   Component Date Value Ref Range Status    CEA 07/29/2020 3.7  0.0 - 5.0 ng/mL Final      ?   Lab Results   Component Value Date    WBC 4.72 07/16/2020    HGB 13.1 (L) 07/16/2020    HCT 42.3 07/16/2020    MCV 90 07/16/2020     07/16/2020       CMP  Sodium   Date Value Ref Range Status   07/16/2020 143 136 - 145 mmol/L Final     Potassium   Date Value Ref Range Status   07/16/2020  4.4 3.5 - 5.1 mmol/L Final     Chloride   Date Value Ref Range Status   07/16/2020 108 95 - 110 mmol/L Final     CO2   Date Value Ref Range Status   07/16/2020 24 23 - 29 mmol/L Final     Glucose   Date Value Ref Range Status   07/16/2020 106 70 - 110 mg/dL Final     BUN, Bld   Date Value Ref Range Status   07/16/2020 18 8 - 23 mg/dL Final     Creatinine   Date Value Ref Range Status   07/16/2020 1.6 (H) 0.5 - 1.4 mg/dL Final     Calcium   Date Value Ref Range Status   07/16/2020 9.5 8.7 - 10.5 mg/dL Final     Total Protein   Date Value Ref Range Status   07/16/2020 8.1 6.0 - 8.4 g/dL Final     Albumin   Date Value Ref Range Status   07/16/2020 3.5 3.5 - 5.2 g/dL Final     Total Bilirubin   Date Value Ref Range Status   07/16/2020 0.3 0.1 - 1.0 mg/dL Final     Comment:     For infants and newborns, interpretation of results should be based  on gestational age, weight and in agreement with clinical  observations.  Premature Infant recommended reference ranges:  Up to 24 hours.............<8.0 mg/dL  Up to 48 hours............<12.0 mg/dL  3-5 days..................<15.0 mg/dL  6-29 days.................<15.0 mg/dL       Alkaline Phosphatase   Date Value Ref Range Status   07/16/2020 95 55 - 135 U/L Final     AST   Date Value Ref Range Status   07/16/2020 35 10 - 40 U/L Final     ALT   Date Value Ref Range Status   07/16/2020 16 10 - 44 U/L Final     Anion Gap   Date Value Ref Range Status   07/16/2020 11 8 - 16 mmol/L Final     eGFR if    Date Value Ref Range Status   07/16/2020 49 (A) >60 mL/min/1.73 m^2 Final     eGFR if non    Date Value Ref Range Status   07/16/2020 43 (A) >60 mL/min/1.73 m^2 Final     Comment:     Calculation used to obtain the estimated glomerular filtration  rate (eGFR) is the CKD-EPI equation.          ?   Imaging:  ?  8/5/20  NM PET CT ROUTINE     CLINICAL HISTORY:  treatment planning/staging lung cancer;  Malignant neoplasm of unspecified part of left bronchus  or lung     TECHNIQUE:  Segmented attenuation corrected 3-D PET imaging was obtained from the skull base through the mid thighs utilizing 12.35 mCi F-18-FDG.  Noncontrast CT imaging was performed for attenuation correction, diagnosis, and anatomical fusion with PET.     COMPARISON:  03/13/2020 from outside [place]     FINDINGS:  Head/neck: There is normal physiologic FDG uptake noted within the visualized brain parenchyma. No FDG avid lymphadenopathy within the neck.     Chest: A 15 mm nodule in the superior segment right lower lobe appears minimally larger exhibiting an SUV max of 4.9.  There is a new 14 mm irregular nodular density in the anterior right upper lobe with an SUV max of 3.2.  Several subcentimeter weakly FDG avid nodular densities and low-grade ground-glass opacities noted in the adjacent right upper lobe, also new since prior.  There is chronic volume loss of the right middle lobe and stable non FDG avid perihilar parenchymal opacity.  Additional reticulonodular parenchymal opacities in the right lower lobe remain unchanged and non FDG avid.  There is FDG avid lymphadenopathy in the precarinal space with SUV max of 2.8 and in the right hilum with SUV max of 3.1, not significantly changed from prior.  There are granulomatous calcifications in the subcarinal space, right hilum, and right upper lobe.  No pleural effusion.Aortic and coronary atherosclerosis.     Abdomen/Pelvis: Normal physiologic FDG uptake noted within the liver, spleen, urinary tract, and bowel.No FDG avid retroperitoneal lymph nodes.  Air within the left biliary tree unchanged from prior.  Extensive aortoiliac atherosclerosis.     Skeletal: No FDG avid osseus lesions identified.  Postsurgical changes in the right hip.     Impression:     1. Mild enlargement of FDG avid right lower lobe lung nodule and multiple newly developed FDG avid right upper lobe nodules concerning for disease progression.  2. No  "significant change in FDG avid mediastinal and right hilar lymphadenopathy.          MRI BRAIN W WO CONTRAST     CLINICAL HISTORY:  Malignant neoplasm of unspecified part of left bronchus or lungstaging;     TECHNIQUE:  Sagittal T1. Axial T1, T2, T2 FLAIR, GRE, DWI.  Axial and coronal T1 post contrast.     COMPARISON:  None available.     FINDINGS:  There is no restricted diffusion. Minimal patchy T2 FLAIR hyperintensity within the periventricular and deep white matter bilaterally.  No abnormal enhancement.     The ventricles and sulci are normal in size and configuration. Midline structures are normal. There are preserved arterial flow-voids on T2 weighted imaging. Bilateral mastoid tip effusions.  The paranasal sinuses are clear.     No abnormal marrow signal is identified.     Impression:     1. No evidence of metastatic disease.  2. White matter T2 alteration consistent with mild chronic microvascular ischemia.     Pathology:    I have reviewed the patient's medical history in detail and updated the computerized patient record.    ?   Assessment/Plan:       1. Primary lung adenocarcinoma, right    2. Tobacco abuse    3. Type 2 diabetes mellitus without complication, without long-term current use of insulin    4. Chronic obstructive pulmonary disease, unspecified COPD type        Plan:     # Lung adenocarcinoma, right lower lung, cT4 cN1 cM0 with extensive ipsilateral lung involvement, PD-L1 85%, negative for EGFR, ROS, ALK, BRAF mutations  Outside imaging from 3/2020 with 1.2 cm right lower lobe lesion biopsy proven adenocarcinoma most consistent with lung primary per outside pathology report noting "lipidic non mucinous Positive for napsin A, TTF1 CK5/6 and CK7, negative for CK 20,  p40 and p63."  Unclear mediastinal lymph node involvement.  PET shows mild avidity in bilateral hilar (SUV 2.6 and 2.3 right and left respectively) and enlarged precarinal lymph node 1.3 x 1.5 cm EBUS and precarinal lymph nodes SUV, " "[facility]: 1.2 cm FDG avid lesion in right lower lobe without reported avid lymph node.  Right lower lobe lesion  biopsy positive for lepidic adenocarcinoma" EBUS procedure report without notable in large mint noted less than or equal to 1 cm; pathology per a clinic note showed 11L rare atypical cells, 4R, 4L and 11R without malignancy.    8/5/20 PET Mild enlargement of FDG avid right lower lobe lung nodule and multiple newly developed FDG avid right upper lobe nodules concerning for disease progression.    I had extensive conversation with patient and daughter.  While technically T4 disease with disease involvement on ipsilateral lung, the extent of interval development of nodules throughout right upper lung and primary right lower lung would not lend itself well to encompassing within chemoradiation treatment per my discussion with Dr. Conley in Radiation Oncology who is in agreement with my recommendation to proceed with systemic therapy and restaging after a couple cycles.  After further discussion with patient and his daughter he is very hesitant at pursuing therapy of any kind particularly chemotherapy due to concern for toxicities as he is feeling well currently asymptomatic.  They return today after results of mutational testing return which does show PDL1 85%; mutations for EGFR, ROS, ALK and BRAF negative.  I discussed option for single agent immunotherapy with pembrolizumab; I discussed palliative intent of this therapy and closely evaluate for potential toxicities.  Daughter and patient again are very hesitant to pursue any therapies at this point as he is asymptomatic from his disease.  I do recommend that they meet with nurse practitioner for further teaching on potential toxicities of systemic therapy as well as palliative care, however I did caution that I would not advise delays in initiation of therapy due to concern for further progression of disease.     # colonic lesion/possible " "liver abnormality:  Per outside records  "focal intense FDG uptake at right transverse colon which localizes with bowel wall soft tissue density measuring 2.0 x 2.8 cm which is suspicious for colonic neoplasia which could be a malignant or benign.  3.  Aftormentioned focal intense FDG uptake at right transverse colon immediately abuts the margin of the right liver lobe anteriorly for which involvement of the liver cannot be completely excluded."  Discussed importance of excluding 2nd neoplasm or metastatic disease in colon and or liver.   - repeat PET-CT  I have reviewed with Radiology prior outside imaging with right transverse colon soft tissue abnormality which is no longer apparent, felt less likely associated with his known lung malignancy, understanding limitations of FDG avidity of bowel however colonoscopy on outside only noted sessile polyps tubular adenoma. CEA last week negative. Will continue to monitor in future imaging.     # current tobacco user:  40 pack-year smoker, COPD, tenuous functional status and will need to take into account with treatment planning.      Advance Care Planning     Referral to palliative care, need to reschedule       Follow-Up:   Systemic therapy/immunotherapy teaching/consent next week  Palliative care consultation  RV for C1D1 and labs same day CBC, CMP and TSH if choose to proceed with treatment        "

## 2020-08-28 NOTE — PLAN OF CARE
DISCONTINUE ON PATHWAY REGIMEN - Non-Small Cell Lung    [id number]        Pemetrexed (Alimta)       Carboplatin (Paraplatin)           Additional Orders: Note: Patient to receive the following prior to the   initiation of therapy:  1) Dexamethasone 4 mg orally twice daily x 6 doses.    First dose 24 hours before chemotherapy.  2) Folic acid ? 400 mcg orally daily.    First dose at least 5 days prior to the first dose of pemetrexed.  3) Vitamin   B12 1,000 mcg intramuscularly every 9 weeks.  First dose at least 5 days prior   to the first dose of pemetrexed.    All AUC calculations intended to be used in   [place] [employer]    **Always confirm dose/schedule in your pharmacy ordering system**    REASON: Other Reason  PRIOR TREATMENT: [id number]: Carboplatin AUC=5 + Pemetrexed 500 mg/m2 q21 Days x 1   Cycle  TREATMENT RESPONSE: N/A - Adjuvant Therapy    START ON PATHWAY REGIMEN - Non-Small Cell Lung    [id number]        Pembrolizumab (Keytruda)           Additional Orders: Severe immune-mediated reactions can occur. See   prescribing information for more details and required immediate management with   steroids. Monitor thyroid, renal, liver function tests, glucose, and sodium at   baseline and before each dose of pembrolizumab. Ref: Keytruda (pembrolizumab)   prescribing information, 2016.    **Always confirm dose/schedule in your pharmacy ordering system**    Patient Characteristics:  Stage IV Metastatic, Nonsquamous, Initial Chemotherapy/Immunotherapy, PS = 0, 1,   ALK Rearrangement Negative and EGFR Mutation Negative/Non-Sensitizing, PD-L1   Expression Positive  ? 50% (TPS) and Immunotherapy Candidate  AJCC T Category: T4  Current Disease Status: Distant Metastases  AJCC N Category: N1  AJCC M Category: M0  AJCC 8 Stage Grouping: IIIA  Histology: Nonsquamous Cell  ROS1 Rearrangement Status: Negative  T790M Mutation Status: Not Applicable - EGFR Mutation Negative/Unknown  Other Mutations/Biomarkers: No Other Actionable  Mutations  NTRK Gene Fusion Status: Negative  PD-L1 Expression Status: PD-L1 Positive ? 50% (TPS)  Chemotherapy/Immunotherapy LOT: Initial Chemotherapy/Immunotherapy  Molecular Targeted Therapy: Not Appropriate  ALK Rearrangement Status: Negative  EGFR Mutation Status: Negative/Wild Type  BRAF V600E Mutation Status: Negative  ECOG Performance Status: 1  Immunotherapy Candidate Status: Candidate for Immunotherapy  Intent of Therapy:  Non-Curative / Palliative Intent, Discussed with Patient

## 2020-08-28 NOTE — NURSING
LM for pt / daughter to call me back at my direct number regarding decision on plan for treatment.   Oncology Navigation   Intake  Cancer Type: Thoracic  MD Assigned: jazmine  Initial Nurse Navigator Contact: 08/10/20  Contact Method: Pool basket  Date Worked: 20  First Appointment Available: 20  Appointment Date: 20  Schedule to Appointment Timeline (days): 3  First Available Date vs. Scheduled Date (days): 0  Reason for Treatment Delay: Other (pt deciding whether he wants chemo / considering options)     Treatment  Treatment Type(s): Chemotherapy  Chemotherapy Regimen: Carboplatin, Alimta poss Keytruda              Support Systems: Children  Barriers of Care: Other  Other Barriers: pt deciding whether he wants treatment or not, considering options      Acuity  Systemic Treatment - predicted or initiated: Chemotherapy regimen with multiple drugs (+1)  Treatment Tolerability: Has not started treatment yet/treatment fully completed and side effects resolved  ECO  Comorbidities in Medical History: 1   Needed: 0  Support: 0  Verbalizes the need for more education: 1  Navigation Acuity: 6     Follow Up  No follow-ups on file.

## 2020-08-28 NOTE — PATIENT INSTRUCTIONS
Systemic therapy/immunotherapy teaching/consent next week  Palliative care consultation  RV for C1D1 and labs same day CBC, CMP and TSH if choose to proceed with treatment

## 2020-08-31 ENCOUNTER — DOCUMENTATION ONLY (OUTPATIENT)
Dept: HEMATOLOGY/ONCOLOGY | Facility: CLINIC | Age: 70
End: 2020-08-31

## 2020-08-31 ENCOUNTER — DOCUMENTATION ONLY (OUTPATIENT)
Dept: PHARMACY | Facility: HOSPITAL | Age: 70
End: 2020-08-31

## 2020-08-31 NOTE — NURSING
Spoke with pt daughter this am to assess decision on setting up immunotherapy appt.  She states pt wishes to attend education session regarding this therapy and then see palliative care doctor as scheduled for 20 and then make the decision whether to get treatment or not.  Offered a sooner palliative care appt and was declined. She states they will call me on 20 with the decision regarding treatment.  They have my direct number . I will follow;   Oncology Navigation   Intake  Cancer Type: Thoracic  MD Assigned: jazmine  Initial Nurse Navigator Contact: 08/10/20  Contact Method: Pool basket  Date Worked: 20  First Appointment Available: 20  Appointment Date: 20  Schedule to Appointment Timeline (days): 3  First Available Date vs. Scheduled Date (days): 0  Reason for Treatment Delay: Other (pt deciding whether he wants chemo / considering options)     Treatment  Treatment Type(s): Chemotherapy  Chemotherapy Regimen: Carboplatin, Alimta poss Keytruda              Support Systems: Children  Barriers of Care: Other  Other Barriers: pt deciding whether he wants treatment or not, considering options      Acuity  Systemic Treatment - predicted or initiated: Chemotherapy regimen with multiple drugs (+1)  Treatment Tolerability: Has not started treatment yet/treatment fully completed and side effects resolved  ECO  Comorbidities in Medical History: 1   Needed: 0  Support: 0  Verbalizes the need for more education: 1  Navigation Acuity: 6     Follow Up  No follow-ups on file.

## 2020-09-01 ENCOUNTER — OFFICE VISIT (OUTPATIENT)
Dept: HEMATOLOGY/ONCOLOGY | Facility: CLINIC | Age: 70
End: 2020-09-01
Payer: COMMERCIAL

## 2020-09-01 DIAGNOSIS — C34.92 PRIMARY LUNG ADENOCARCINOMA, LEFT: ICD-10-CM

## 2020-09-01 DIAGNOSIS — Z71.9 ENCOUNTER FOR EDUCATION: Primary | ICD-10-CM

## 2020-09-01 PROCEDURE — 99214 PR OFFICE/OUTPT VISIT, EST, LEVL IV, 30-39 MIN: ICD-10-PCS | Mod: S$PBB,,, | Performed by: NURSE PRACTITIONER

## 2020-09-01 PROCEDURE — 99214 OFFICE O/P EST MOD 30 MIN: CPT | Mod: S$PBB,,, | Performed by: NURSE PRACTITIONER

## 2020-09-01 PROCEDURE — 99999 PR PBB SHADOW E&M-EST. PATIENT-LVL II: CPT | Mod: PBBFAC,,, | Performed by: NURSE PRACTITIONER

## 2020-09-01 PROCEDURE — 99999 PR PBB SHADOW E&M-EST. PATIENT-LVL II: ICD-10-PCS | Mod: PBBFAC,,, | Performed by: NURSE PRACTITIONER

## 2020-09-01 PROCEDURE — 99212 OFFICE O/P EST SF 10 MIN: CPT | Mod: PBBFAC | Performed by: NURSE PRACTITIONER

## 2020-09-01 NOTE — PROGRESS NOTES
70 y/o male who presents to the [facility] today for immunotherapy teaching.  Patient given the Navigation Notebook.  I had a detailed discussion with patient in regards to how to use notebook.  I had a detailed discussion with patient regarding the rationale for immunotherapy, how the immunotherapy works, the process of treatment, potential side effects along with managing the potential side effects.  Also discussed with patient signs and symptoms to report.     I had a detailed discussion with the patient and reviewed the specific medication(s) and gave them a handout describing the potential side effects of KEYTRUDA along with the management of those potential side effects. Also discussed with patient signs and symptoms to report.     Discussed in detail potential side effects and symptoms to report such as:  Immune Reactions  Colitis  Bowel Obstruction/perforation  Skin Reactions  Pneumonitis  Hepatitis  Brain/Nerve Problems  Kidney Inflammation/Failure  Hormone Abnormalities  Eye changes  Fatigue  Reproductive concerns/Effective birth control/Avoiding fathering a child    45 MIN TOTAL FACE-TO-FACE TIME SPENT WITH PATIENT.  30 MIN SPENT COMPLETING KEYTRUDA EDUCATION.  10 MIN SPENT ADDRESSING QUESTIONS AND CONCERNS.  5 MIN SPENT DISCUSSING ADVANCED CARE PLANNING    Encounter for Education  Immunotherapy teaching completed. Upcoming appointment with palliative care. Patient has not yet made decision in regards to pursuing treatment. Would not like to sign consent until he speaks with his daughter    Advance Care Planning     Power of   I initiated the process of advance care planning today and explained the importance of this process to the patient. At the present time patient does not have any advanced directives in place. I introduced the concept of advance directives to the patient, as well. Then the patient received detailed information regarding types of advanced care planning. Patient has  upcoming palliative care appointment which he will discuss further. He would like his daughter to be present when these discussions take place in detail. Daughter lives out of state with plans to visit later this month.

## 2020-09-01 NOTE — ASSESSMENT & PLAN NOTE
Immunotherapy teaching completed. Upcoming appointment with palliative care. Patient has not yet made decision in regards to pursuing treatment. Would not like to sign consent until he speaks with his daughter

## 2020-09-16 ENCOUNTER — TELEPHONE (OUTPATIENT)
Dept: PALLIATIVE MEDICINE | Facility: CLINIC | Age: 70
End: 2020-09-16

## 2020-09-16 NOTE — TELEPHONE ENCOUNTER
Palliative Care:    Calling to re-schedule 9/22/20 appointment with Dr. Arzate as she will not be in the office this day.  Called daughter, Penelope, No answer, no voicemail.  We re-attempt later.    Farzaneh Ramos RN

## 2020-09-18 ENCOUNTER — TELEPHONE (OUTPATIENT)
Dept: PALLIATIVE MEDICINE | Facility: CLINIC | Age: 70
End: 2020-09-18

## 2020-09-18 NOTE — TELEPHONE ENCOUNTER
Palliative Care:    Spoke with Nel Hoffman PA-C, who will see patient at originally scheduled date/time in the clinic: 9/22/20 3pm.  Confirmed with daughter.    Farzaneh Ramos RN

## 2020-09-21 ENCOUNTER — DOCUMENTATION ONLY (OUTPATIENT)
Dept: HEMATOLOGY/ONCOLOGY | Facility: CLINIC | Age: 70
End: 2020-09-21

## 2020-09-21 NOTE — NURSING
Spoke with pt dtr  regarding questions she has about needing FMLA papers filled out. She states he has an appt with palliative care and would like to give papers to whomever needs to fill them out. I told  since his appt is with Nel Wood PA-C at 3pm at the cancer center, she can ask for Terri Grace,  and she would assist with paperwork. I also told her that myself and Vianey would be here if Terri is unavailable. She voiced understanding all information and knows to call with any questions/concerns.   Oncology Navigation   Intake  Cancer Type: Thoracic  MD Assigned: jazmine  Initial Nurse Navigator Contact: 08/10/20  Contact Method: Pool basket  Date Worked: 20  First Appointment Available: 20  Appointment Date: 20  Schedule to Appointment Timeline (days): 3  First Available Date vs. Scheduled Date (days): 0  Reason for Treatment Delay: Other (pt deciding whether he wants chemo / considering options)     Treatment  Treatment Type(s): Chemotherapy  Chemotherapy Regimen: Carboplatin, Alimta poss Keytruda              Support Systems: Children  Barriers of Care: Other  Other Barriers: pt deciding whether he wants treatment or not, considering options      Acuity  Systemic Treatment - predicted or initiated: Chemotherapy regimen with multiple drugs (+1)  Treatment Tolerability: Has not started treatment yet/treatment fully completed and side effects resolved  ECO  Comorbidities in Medical History: 1   Needed: 0  Support: 0  Verbalizes the need for more education: 1  Navigation Acuity: 6     Follow Up  No follow-ups on file.

## 2020-09-22 ENCOUNTER — INITIAL CONSULT (OUTPATIENT)
Dept: PALLIATIVE MEDICINE | Facility: CLINIC | Age: 70
End: 2020-09-22
Payer: OTHER GOVERNMENT

## 2020-09-22 ENCOUNTER — TELEPHONE (OUTPATIENT)
Dept: PALLIATIVE MEDICINE | Facility: CLINIC | Age: 70
End: 2020-09-22

## 2020-09-22 ENCOUNTER — DOCUMENTATION ONLY (OUTPATIENT)
Dept: HEMATOLOGY/ONCOLOGY | Facility: CLINIC | Age: 70
End: 2020-09-22

## 2020-09-22 DIAGNOSIS — Z51.5 ENCOUNTER FOR PALLIATIVE CARE: ICD-10-CM

## 2020-09-22 DIAGNOSIS — C34.92 PRIMARY LUNG ADENOCARCINOMA, LEFT: Primary | ICD-10-CM

## 2020-09-22 PROCEDURE — 99999 PR PBB SHADOW E&M-EST. PATIENT-LVL III: ICD-10-PCS | Mod: PBBFAC,,, | Performed by: PHYSICIAN ASSISTANT

## 2020-09-22 PROCEDURE — 99204 PR OFFICE/OUTPT VISIT, NEW, LEVL IV, 45-59 MIN: ICD-10-PCS | Mod: 25,S$PBB,, | Performed by: PHYSICIAN ASSISTANT

## 2020-09-22 PROCEDURE — 99213 OFFICE O/P EST LOW 20 MIN: CPT | Mod: PBBFAC,25 | Performed by: PHYSICIAN ASSISTANT

## 2020-09-22 PROCEDURE — 99497 PR ADVNCD CARE PLAN 30 MIN: ICD-10-PCS | Mod: S$PBB,,, | Performed by: PHYSICIAN ASSISTANT

## 2020-09-22 PROCEDURE — 99204 OFFICE O/P NEW MOD 45 MIN: CPT | Mod: 25,S$PBB,, | Performed by: PHYSICIAN ASSISTANT

## 2020-09-22 PROCEDURE — 99497 ADVNCD CARE PLAN 30 MIN: CPT | Mod: S$PBB,,, | Performed by: PHYSICIAN ASSISTANT

## 2020-09-22 PROCEDURE — 99497 ADVNCD CARE PLAN 30 MIN: CPT | Mod: PBBFAC | Performed by: PHYSICIAN ASSISTANT

## 2020-09-22 PROCEDURE — 99999 PR PBB SHADOW E&M-EST. PATIENT-LVL III: CPT | Mod: PBBFAC,,, | Performed by: PHYSICIAN ASSISTANT

## 2020-09-22 NOTE — TELEPHONE ENCOUNTER
[facility]  Medical Specialty       Patient Name: Mikey Saldaña  MRN: [id number]  Primary Care Physician: [facility] [place] [facility]    Hospice referral faxed to [facility]. Also contacted Mariaelena, hospice liaison, to notify her of referral. [facility]ed [facility] call [facility] ([phone]) to request authorization for hospice, but everyone had left for the day. Will try again tomorrow and hospice to also assist with this.    Laura Lawler, MSW, [facility]  [phone]

## 2020-09-22 NOTE — LETTER
September 22, 2020      Alyssa Marie MD  [address]  [place] [address]           [facility] [address] [address]  Phone: [phone]  Fax: [phone]          Patient: Mikey Saldaña   MR Number: [id number]   YOB: 1950   Date of Visit: 9/22/2020       Dear Dr. Alyssa Marie:    Thank you for referring Mikey Saldaña to me for evaluation. Attached you will find relevant portions of my assessment and plan of care.    If you have questions, please do not hesitate to call me. I look forward to following Mikey Saldaña along with you.    Sincerely,    Nel Hoffman PA-C    Enclosure  CC:  No Recipients    If you would like to receive this communication electronically, please contact [email or web address] or [phone] to request more information on [employer] access.    For providers and/or their staff who would like to refer a patient to [facility], please contact us through our one-stop-shop provider referral line, [facility], at [phone].    If you feel you have received this communication in error or would no longer like to receive these types of communications, please e-mail [email or web address]

## 2020-09-22 NOTE — PROGRESS NOTES
Advance Care Planning   OP Consult Note  Palliative Care      Consult Requested By: Dr. Marie  Reason for Consult: Goals of care    SUBJECTIVE:     History of Present Illness:  Disease Process: Cancer  Mr. Saldaña is a 68yo with a history of stage IV lung cancer, HTN, and DM 2 who presented to the clinic for initial consult regarding goals of care. He was accompanied by his sister Raissa and daughter  who traveled from [place] for the appointment. Mr. Garza was able to tell me that he has lung cancer and that immunotherapy was offered. He understands with this treatment he would likely be able to extend his life low adverse symptom profile. He is aware that restaging with possible radiation therapy would be in the future. He says that he is not interested in cancer targeted therapy as he would prefer to focus on comfort and quality of life. He says that he does not like toxins and would not want to decrease his quality of life in any way. He understands that without cancer therapy his life could be shorter, the cancer is expected to progress, and could develop symptoms related to the cancer. He says he is open to pain and symptom management but that is the only treatment he is willing to accept. His daughter and sister were supportive of his decision as it appears they have discussed his wishes previously. I discussed the philosophy of hospice, providing supportive care services to maximize quality of life and comfort care at the end of life. I also discussed the services available with hospice including but not limited to: CNA visit up to an hour a day usually every other day, RN visit usually every other day, MD to oversee care, 24hr phone number to call with questions or concerns, comfort medications, DME, access to GIP unit for acute symptom management, access to respite care, , SW, volunteer program, and bereavement support. He would like to enroll in hospice as home based care is the ideal situation  for him. He lives in [place] and does not have a preference in agency. He does not have pain or symptoms at this time but understands that hospice will manage his symptoms going forward.    In light of his goals of care I engaged him in a discussion about code status. I explained his options thoroughly including FULL and DNR/ DNI. He elects DNR/ DNI as he would like a peaceful death without heroic measures. He has 3 daughters but  is the only one he can depend on. She is making plans to be able to come to [place] and care for him in full time capacity. We completed [employer] paperwork today as well.       No past medical history on file.  Past Surgical History:   Procedure Laterality Date    NECK SURGERY      PANCREAS SURGERY      SHOULDER SURGERY       No family history on file.  Social History     Tobacco Use    Smoking status: Current Every Day Smoker    Smokeless tobacco: Never Used   Substance Use Topics    Alcohol use: Never     Frequency: Never    Drug use: Not on file       Mental Status: Oriented x3    ECOG Performance Status Grade: 0 - Fully Active    Review of Systems:  Review of Systems   Constitutional: Negative for chills, fever, malaise/fatigue and weight loss.   HENT: Negative for sinus pain and sore throat.    Respiratory: Negative for cough and shortness of breath.    Cardiovascular: Negative for chest pain and palpitations.   Gastrointestinal: Negative for abdominal pain, diarrhea, nausea and vomiting.   Genitourinary: Negative for dysuria, frequency and urgency.   Musculoskeletal: Negative for back pain, falls, joint pain and myalgias.   Neurological: Negative for dizziness, sensory change and headaches.   Psychiatric/Behavioral: Negative for depression, memory loss and suicidal ideas. The patient is not nervous/anxious.        OBJECTIVE:   Pain Assessment: No pain reported at this time    Decision-Making Capacity: Patient answered questions  Mental Health/Substance Use  History:    Advanced Directives:  Living Will: No  Do Not Resuscitate Status: Yes  Medical Power of : Yes. Agent's Name: Valerie Krishna. Agent's Contact Number: [phone]  Registered Organ Donor: No    Psychosocial:  Work/Education History:   History: yes    Marital Status:   Household Composition: 1  Children: 3  Relationships with Family: great relationship with daughter . Other 2 daughters are not very involved in his care.  Living Arrangements: Lives alone  Support Systems-Family & Community (Home  Health, [facility] [place]):  Family support    Transportation Issues:  no  Financial Issues or Concerns :    Strengths/Coping Strategies:    Self-Care Activities/Hobbies:    Patient's most important priorities:  Focusing on quality of life    Patient's biggest concerns/fears:  Receiving toxins or chemicals that could make him feel bad    Patient's goals/hopes:  Enroll in hospice and focus on maximizing quality of life.    ASSESSMENT/PLAN:   1. Stage IV lung cancer  - Dr. Marie's notes reviewed  - Immunotherapy offered however he is not interested and wishes to focus on comfort  - Hospice referral placed  - No pain or symptoms to address today, defer to hospice    2. Encounter for Palliative Care  - Code status: DNR/ DNI  - HCPOA: Valerie Krishna, alternate: sister Raissa, third: Molly Reynaga completed  - PM  assisting with hospice referral         Courtney Mosley PA-C [facility]   Palliative Medicine    > 50% of 45 min visit spent in chart review, face to face discussion of goals of care, symptom assessment, coordination of care and emotional support  20 min ACP time spent discussing goals of care, code status, prognosis, ACP completion, weighing benefit and burden of medical treatments, hospice education.  Total visit time: 65 min

## 2020-09-22 NOTE — PROGRESS NOTES
Pt's daughter requested SW assistance with FMLA paperwork--needs it done today. SW worked with nurse practitioner to get paperwork completed/expedited for daughter's job. It was also noted that pt has chosen to go to hospice care so SW will plan to f/u further as needed/requested.    Oncology Social Work   Intake  Cancer Type: Thoracic  MD Assigned: Alyssa Marie  Date of referral to social work: 09/22/20  Initial social work contact: 09/22/20  Referral to initial contact timeline: 0  Referral contact method: Instant message  Contact method: In person  Date worked: 09/22/20     Intervention  Treatment Type(s): Chemotherapy  Chemotherapy Regimen: Carboplatin, Alimta poss Keytruda        Support Systems: Children  Barriers of Care: Other  Other Barriers: pt deciding whether he wants treatment or not, considering options      Supportive Checklist      Follow Up  No follow-ups on file.

## 2020-09-23 ENCOUNTER — DOCUMENTATION ONLY (OUTPATIENT)
Dept: HEMATOLOGY/ONCOLOGY | Facility: CLINIC | Age: 70
End: 2020-09-23

## 2020-09-23 ENCOUNTER — TELEPHONE (OUTPATIENT)
Dept: PALLIATIVE MEDICINE | Facility: CLINIC | Age: 70
End: 2020-09-23

## 2020-09-23 NOTE — TELEPHONE ENCOUNTER
[facility] - Palliative Care  Medical Specialty       Patient Name: Mikey Saldaña  MRN: [id number]  Primary Care Physician: [facility] [place] [facility] [place]    Contacted Abby in intake at [facility] for another [facility] contact as the person who typically handles hospice referrals is on leave. Abby provided contact info for Jennifer who also helps with hospice authorizations ([phone]). Spoke to Jennifer who will review chart and request [facility]. Explained we would like to get him admitted to hospice today if this could be expedited. Jennifer will call back with update.      Laura Lawler, MSW, [facility]  [phone]

## 2020-09-23 NOTE — NURSING
Per Dr. Marie's request in cc'd chart :  Spoke with pt daughter  over the phone with pt on speaker phone this am to confirm pt choice to enter Hospice care.  They both agreed and stated that this is his choice and they are currently awaiting the Hospice team to contact them to perform the first home visit. Pt daughter states she has multiple questions for the hospice team as far as how things will work and whether she can move her father out of state once he starts to decline further.  I explained that the hospice team will have social workers on their team to help with these types of questions as well as all other concerns she may have.  She agreed to write these concerns and questions down and have them ready for the hospice team upon arrival. She has my direct number and I have instructed her that she may still please call me with an further concerns.  She state appreciation and agreed.   Oncology Navigation   Intake  Cancer Type: Thoracic  MD Assigned: Alyssa Marie  Initial Nurse Navigator Contact: 08/10/20  Contact Method: Pool basket  Date Worked: 20  First Appointment Available: 20  Appointment Date: 20  Schedule to Appointment Timeline (days): 3  First Available Date vs. Scheduled Date (days): 0  Reason for Treatment Delay: Other (pt deciding whether he wants chemo / considering options)     Treatment  Treatment Type(s): Chemotherapy  Chemotherapy Regimen: Carboplatin, Alimta poss Keytruda              Support Systems: Children  Barriers of Care: Other  Other Barriers: pt deciding whether he wants treatment or not, considering options      Acuity  Systemic Treatment - predicted or initiated: Chemotherapy regimen with multiple drugs (+1)  Treatment Tolerability: Has not started treatment yet/treatment fully completed and side effects resolved  ECO  Comorbidities in Medical History: 1   Needed: 0  Support: 0  Verbalizes the need for more education: 1  Navigation  Acuity: 6     Follow Up  No follow-ups on file.

## 2020-09-23 NOTE — TELEPHONE ENCOUNTER
[facility] - Palliative [facility]  Medical Specialty       Patient Name: Mikey Saldaña  MRN: [id number]  Primary Care Physician: [facility] [place] [facility] [place] Rodriguez with [facility] called back to provide update. She is still waiting for doctor to put in order, but she will provide update as soon as she can. Also let patient's daughter know about this. [facility] has also been in touch with family. Patient's daughter plans to stay in town until hospice is set up.    Laura Lawler, [place], [facility]  [phone]

## 2020-09-24 ENCOUNTER — TELEPHONE (OUTPATIENT)
Dept: PALLIATIVE MEDICINE | Facility: CLINIC | Age: 70
End: 2020-09-24

## 2020-09-24 NOTE — TELEPHONE ENCOUNTER
[facility] - Palliative Care  Medical Specialty       Patient Name: Mikey Saldaña  MRN: [id number]  Primary Care Physician: [facility] [place] [facility] [place]    [facility] the [facility]. Hospice referral has been process, and Jennifer has informed [facility] they can admit patient.      Laura Lawler, MSW, [facility]  [phone]

## 2021-05-14 NOTE — Clinical Note
Please discuss btw nurse navigation and Henrique getting outside records and if he can be referred to rad onc since [facility] pt [facility] Regimen: Cerave or Cetaphil CREAM\\nTriple Paste or Zinc oxide as barrier cream Detail Level: Zone Initiate Treatment: Betamethasone cream twice daily for 7-10 days Plan: 1.  RTC in two weeks